# Patient Record
Sex: FEMALE | Race: WHITE | NOT HISPANIC OR LATINO | Employment: FULL TIME | URBAN - METROPOLITAN AREA
[De-identification: names, ages, dates, MRNs, and addresses within clinical notes are randomized per-mention and may not be internally consistent; named-entity substitution may affect disease eponyms.]

---

## 2017-06-07 DIAGNOSIS — E03.9 HYPOTHYROIDISM: ICD-10-CM

## 2017-08-17 ENCOUNTER — GENERIC CONVERSION - ENCOUNTER (OUTPATIENT)
Dept: OTHER | Facility: OTHER | Age: 47
End: 2017-08-17

## 2017-08-18 ENCOUNTER — GENERIC CONVERSION - ENCOUNTER (OUTPATIENT)
Dept: OTHER | Facility: OTHER | Age: 47
End: 2017-08-18

## 2017-08-18 LAB
T4 FREE SERPL-MCNC: 1.92 NG/DL (ref 0.82–1.77)
TSH SERPL DL<=0.05 MIU/L-ACNC: 0.79 UIU/ML (ref 0.45–4.5)

## 2017-08-30 ENCOUNTER — ALLSCRIPTS OFFICE VISIT (OUTPATIENT)
Dept: OTHER | Facility: OTHER | Age: 47
End: 2017-08-30

## 2017-10-25 NOTE — PROGRESS NOTES
Assessment  1  Hypothyroidism (244 9) (E03 9)    Plan  Hypothyroidism    · Synthroid 125 MCG Oral Tablet (Levothyroxine Sodium); take 1 tablet by mouth  every day   Rx By: Sukhwinder Patches; Dispense: 30 Days ; #:30 Tablet; Refill: 11; For: Hypothyroidism; VALENTE = Y; Verified Transmission to [facility]/PHARMACY #[id number]; Last Updated By: System, [employer]; 2017 3:57:20 PM   · (1) T4, FREE; Status:Active; Requested for:2018; Perform:[facility]; PMA:[id number];XYEJJRL;OOY:[id number]; Ordered By:Rose Gonzáles;   · (1) TSH; Status:Active; Requested for:2018; Perform:[facility]; [id number];YJZ:[id number]; Ordered By:Rose Gonzáles;   · Follow-up visit in 1 year Evaluation and Treatment  Follow-up  Status: Hold For -  Scheduling  Requested for: 48Uhl5488   Ordered; For: Hypothyroidism; Ordered By: Sukhwinder Goldberg Performed:  Due: 38QXO2062    Hypothyroidism: Continue synthroid  repeat TSH/Free T4 in 1 year  Most recent ultrasound did not show any thyroid nodules in    If she notices more symptoms, will let us know and will repeat ultrasound  Follow up in 1 year     Chief Complaint  Chief Complaint Free Text Note Form: Follow Up      History of Present Illness  HPI: Victor Hugo Vegas is a 40year old female with hypothyroidism she is currently taking Synthroid 125mcg daily  She is feeling well  Previously was over 300 pounds-- has been able to lose weight and keep off weight with diet and exercise  Prior history of thyroid nodule but most recent ultrasound showed no nodule  Does feel a little uncomfortable sensation in throat at times like she needs to clear throat  Review of Systems  ROS Reviewed:   ROS reviewed         Endo Adult ROS Female Established v2 Update - [facility] [place]:   Constitutional/General: no recent weight gain,-- no recent weight loss,-- no poor energy/fatigue,-- no increased energy level,-- no insomnia/sleep problems,-- no fever-- and-- no feeling weak  Breasts: no nipple discharge  Heart: no high blood pressure,-- no chest pain/tightness,-- no rapid/racing heart rate-- and-- no palpitations  Genitourinary - Urinary: no frequent urination,-- no excess urination-- and-- no urinating during the night  Eyes: no blurred vision,-- no double vision,-- no bulging eyes,-- no gritty/scratchy eyes-- and-- no excessive tearing  Mouth / Throat: no hoarseness-- and-- no difficulty swallowing  Neck: no lumps,-- no swollen glands,-- no neck pain,-- no neck stiffness-- and-- no enlarged thyroid  Respiratory: no wheezing,-- no asthma-- and-- no persistent cough  Musculoskeletal: no muscle aches/pain,-- no joint aches/pain-- and-- no muscle weakness  Skin & Hair: no dry skin,-- no acne,-- the hair texture was not oily,-- no hair loss-- and-- no excessive hair growth  Gastrointestinal: no constipation,-- no diarrhea,-- no waking at night to drink-- and-- no stomach ache  Neurological: no blackouts,-- no weakness-- and-- no tremors  Reproductive:  frequency of period is not applicable  -- duration of period is not applicable  -- Date of last menstruation is not applicable  -- regular periods are not applicable  -- discomfort with periods is not applicable  -- excessive bleeding during period is not applicable  -- mood swings are not applicable  Endocrine: no feeling hot frequently,-- no feeling cold frequently,-- no shifts between feeling hot and cold,-- no cold hands or feet,-- no excessive sweating,-- thyroid problems,-- no blood sugar problems,-- no excessive thirst,-- no excessive hunger,-- no change in shoe size,-- no nausea or vomiting-- and-- no shaky hands  Active Problems  1  Hypothyroidism (244 9) (E03 9)   2  Thyroid nodule (241 0) (E04 1)    Past Medical History  Active Problems And Past Medical History Reviewed: The active problems and past medical history were reviewed and updated today  Surgical History  1   History of  Section  Surgical History Reviewed: The surgical history was reviewed and updated today  Family History  Mother    1  Family history of thyroid disease (V18 19) (Z83 49)  Father    2  Family history of malignant neoplasm (V16 9) (Z80 9)  Grandmother    3  Family history of cardiac disorder (V17 49) (Z82 49)   4  Family history of malignant neoplasm (V16 9) (Z80 9)  Family History    5  Family history of Cancer   6  Family history of Heart Disease (V17 49)  Family History Reviewed: The family history was reviewed and updated today  Social History   · Denied: History of Alcohol Use (History)   · Denied: History of Drug Use   · Never A Smoker  Social History Reviewed: The social history was reviewed and updated today  The social history was reviewed and is unchanged  Current Meds   1  Minastrin 24 Fe 1-20 MG-MCG(24) Oral Tablet Chewable Recorded   2  Synthroid 125 MCG Oral Tablet; take 1 tablet by mouth every day; Therapy: 77Vak0625 to (Evaluate:88Suk9886)  Requested for: 86Wfi1068; Last   Rx:73Pgb9901 Ordered  Medication List Reviewed: The medication list was reviewed and updated today  Allergies  1  Latex Exam Gloves MISC   2  Penicillins    Vitals  Vital Signs    Recorded: 98Tlm8225 03:35PM   Heart Rate 78   Systolic 930   Diastolic 70   Height 5 ft 3 in   Weight 157 lb 0 16 oz   BMI Calculated 27 81   BSA Calculated 1 75     Physical Exam    Constitutional   General appearance: No acute distress, well appearing and well nourished  Eyes   Conjunctiva and lids: No swelling, erythema, or discharge  Pupils: Equal, round and reactive to light  The sclera are anicteric  Extraocular movements are intact  Ears, Nose, Mouth, and Throat   External inspection of ears, nose and lips: Normal     Oropharynx: Normal with no erythema, edema, exudate or lesions  Exam of Head: The head is atraumatic and normocephalic  Neck: Abnormal  -- slight thyromegaly     Pulmonary Auscultation of lungs: Clear to auscultation bilaterally with normal chest expansion  Cardiovascular   Auscultation of heart: Normal rate and rhythm with no murmurs, gallops or rubs  Examination of pulses: Dorsalis pedal pulses are +2 and equal bilaterally  Examination of carotids: No bruit    Abdomen   Abdomen: Abdomen is soft, non-tender with normal bowel sounds  Lymphatic   Palpation of lymph nodes: No supraclavicular or suboccipital lymphadenopathy  Musculoskeletal   Inspection/palpation of joints, bones, and muscles: Muscle bulk and tone is normal     Skin   Skin and subcutaneous tissue: Normal skin temperature and color  Neurologic   Reflexes: 2+ and symmetric  Motor Strength: Strength is 5/5 bilaterally  Psychiatric   Orientation to person, place and time: Normal     Mood and affect: Affect and attention span are normal        Results/Data  (LC) Thyroxine (T4) Free, Direct, S 67Quw2610 08:19AM Selina Odor     Test Name Result Flag Reference   T4,Free(Direct) 1 92 ng/dL H 0 82-1 77     (1) TSH 35PWP2970 08:19AM Selina Odor     Test Name Result Flag Reference   TSH 0 787 uIU/mL  0 450-4 500     U/S Head and Neck ( Soft Tissue) 65Pqn0026 12:45PM Paola Brooklyn     Test Name Result Flag Reference   U/S Head and Neck (Report)     [facility] MRI;3 [facility];;[place];[id number]  08/25/2014 1400  08/25/2014 1430      THYROID ULTRASOUND    INDICATION- Possible nodules  COMPARISON- None  TECHNIQUE-  Ultrasound of the thyroid was performed with a high  frequency linear transducer in transverse and sagittal planes including  volumetric imaging sweeps as well as traditional still imaging  technique  FINDINGS-  Diffusely heterogeneous echotexture bilaterally  Right gland- 5 5 x 1 8 x 1 9 cm  No dominant nodules  Left gland- 4 8 2 0 x 2 1 cm  No dominant nodules  Isthmus- 0 5 cm in AP dimension    No dominant nodules  IMPRESSION-    Diffusely heterogeneous thyroid gland with slight enlargement but no  discrete definable nodule identified  Correlate with thyroid function  tests  Transcribed on- CHENCHO Whitlock DO  [place] Radiologist- [address] CHENCHO Ross DO  Electronically [facility] RAD DO  Released Date Time- 08/22/14 1424  ------------------------------------------------------------------------------  [address] [facility] [place]  [address] [place] [address]     Signatures   Electronically signed by : MARIAH Okeefe;  Aug 30 2017  3:59PM EST                       (Author)    Electronically signed by : MATTHEW Suárez ; Aug 31 2017 11:14AM EST

## 2018-01-09 NOTE — PROGRESS NOTES
Assessment    1  Hypothyroidism (244 9) (E03 9)   2  Thyroid nodule (241 0) (E04 1)    Plan  Hypothyroidism    · Synthroid 125 MCG Oral Tablet (Levothyroxine Sodium); take 1 tablet by mouth  every day   Rx By: Brandy Martinez; Dispense: 30 Days ; #:30 Tablet; Refill: 11; For: Hypothyroidism; VALENTE = Y; Print Rx   · Follow-up visit in 1 year Evaluation and Treatment  Follow-up  Status: Complete  Done:  [id number]   Ordered; For: Hypothyroidism; Ordered By: Brandy Martinez Performed:  Due: 20HYF6852; Last Updated By: Agustina Gao; 5/11/2016 4:35:07 PM     Hypothyroidism:  Had labs done through OB/GYN and should have results by friday  She was given 14 day supply of samples, don't fill RX until lab results available  She will send to office  Once received, may need reduction of medication since she has had weight loss over the past 2 years since last lab test     Thyroid Nodule: resolved on 2014 u/s    F/u in 1 year     Chief Complaint  Chief Complaint Free Text Note Form: Follow Up      History of Present Illness  HPI: Ivone Bennett is a 41year old female with hypothyroidism she is currently taking Synthroid 125mcg daily  she is feeling well  She has lost a significant amount of weight since last blood test with exercise  In general, has been feeling well  Previously was over 300 pounds  Review of Systems  Endo Adult ROS Female Established v2 - [facility]:   Constitutional/General: no recent weight gain, recent weight loss, no poor energy/fatigue, no increased energy level, no insomnia/sleep problems, no fever and no feeling weak  Breasts: no nipple discharge  Heart: no high blood pressure, no chest pain/tightness, no rapid/racing heart rate and no palpitations  Genitourinary - Urinary no frequent urination, no excess urination and no urinating during the night  Eyes: no blurred vision, no double vision, no bulging eyes, no gritty/scratchy eyes and no excessive tearing     Mouth / Throat: no hoarseness and no difficulty swallowing  Neck: no lumps, no swollen glands, no neck pain, no neck stiffness and no enlarged thyroid  Respiratory: no wheezing, no asthma and no persistent cough  Musculoskeletal: no muscle aches/pain, no joint aches/pain and no muscle weakness  Skin & Hair: no dry skin, no acne, the hair texture was not oily, no hair loss and no excessive hair growth  Gastrointestinal: no constipation, no diarrhea, no waking at night to drink and no stomach ache  Neurological: no blackouts, no weakness and no tremors  Reproductive: NA, NA, NA, NA, NA, NA and NA  Endocrine: no feeling hot frequently, no feeling cold frequently, no shifts between feeling hot and cold, no cold hands or feet, no excessive sweating, no thyroid problems, no blood sugar problems, no excessive thirst, no excessive hunger, no change in shoe size, no nausea or vomiting and no shaky hands  ROS Reviewed:   ROS reviewed  Active Problems    1  Hypothyroidism (244 9) (E03 9)   2  Thyroid nodule (241 0) (E04 1)    Past Medical History  Active Problems And Past Medical History Reviewed: The active problems and past medical history were reviewed and updated today  Surgical History    1  History of  Section  Surgical History Reviewed: The surgical history was reviewed and updated today  Family History  Mother    1  Family history of thyroid disease (V18 19) (Z83 49)  Father    2  Family history of malignant neoplasm (V16 9) (Z80 9)  Grandmother    3  Family history of cardiac disorder (V17 49) (Z82 49)   4  Family history of malignant neoplasm (V16 9) (Z80 9)  Family History    5  Family history of Cancer   6  Family history of Heart Disease (V17 49)  Family History Reviewed: The family history was reviewed and updated today  Social History    · Denied: History of Alcohol Use (History)   · Denied: History of Drug Use   · Never A Smoker  Social History Reviewed:  The social history was reviewed and updated today  The social history was reviewed and is unchanged  Current Meds   1  Minastrin 24 Fe 1-20 MG-MCG(24) Oral Tablet Chewable Recorded   2  Synthroid 125 MCG Oral Tablet; take 1 tablet by mouth every day; Therapy: 15Hsr2427 to (Evaluate:96Rlq5717)  Requested for: 62Kjo1494; Last   Rx:55Hyt1476 Ordered  Medication List Reviewed: The medication list was reviewed and updated today  Allergies    1  Latex Exam Gloves MISC   2  Penicillins    Vitals  Vital Signs [Data Includes: Current Encounter]    Recorded: 43GHN5899 04:08PM   Heart Rate 78   Systolic 98   Diastolic 62   Height 5 ft 3 in   Weight 152 lb 0 48 oz   BMI Calculated 26 93   BSA Calculated 1 73     Physical Exam    Constitutional   General appearance: No acute distress, well appearing and well nourished  Eyes   Conjunctiva and lids: No swelling, erythema, or discharge  Pupils: Equal, round and reactive to light  The sclera are anicteric  Extraocular movements are intact  Ears, Nose, Mouth, and Throat   External inspection of ears, nose and lips: Normal     Oropharynx: Normal with no erythema, edema, exudate or lesions  Exam of Head: The head is atraumatic and normocephalic  Neck: The neck is supple  The thyroid is normal in size with no palpable nodules  Pulmonary   Auscultation of lungs: Clear to auscultation bilaterally with normal chest expansion  Cardiovascular   Auscultation of heart: Normal rate and rhythm with no murmurs, gallops or rubs  Examination of pulses: Dorsalis pedal pulses are +2 and equal bilaterally  Examination of carotids: No bruit    Abdomen   Abdomen: Abdomen is soft, non-tender with normal bowel sounds  Lymphatic   Palpation of lymph nodes: No supraclavicular or suboccipital lymphadenopathy      Musculoskeletal   Inspection/palpation of joints, bones, and muscles: Muscle bulk and tone is normal     Skin   Skin and subcutaneous tissue: Normal skin temperature and color     Neurologic   Reflexes: 2+ and symmetric  Motor Strength: Strength is 5/5 bilaterally  Psychiatric   Orientation to person, place and time: Normal     Mood and affect: Affect and attention span are normal        Results/Data  Diagnostic Studies Reviewed:   Diagnostic Review 8/14/2014  TSH 3 360 Free T4 1 28  Results   U/S Head and Neck ( Soft Tissue) 40Cmw1965 12:45PM Beryle Kayser     Test Name Result Flag Reference   U/S Head and Neck (Report)     [facility] MRI;3 [facility];;[place];[address]  08/25/2014 1400  08/25/2014 1430      THYROID ULTRASOUND    INDICATION- Possible nodules  COMPARISON- None  TECHNIQUE-  Ultrasound of the thyroid was performed with a high  frequency linear transducer in transverse and sagittal planes including  volumetric imaging sweeps as well as traditional still imaging  technique  FINDINGS-  Diffusely heterogeneous echotexture bilaterally  Right gland- 5 5 x 1 8 x 1 9 cm  No dominant nodules  Left gland- 4 8 2 0 x 2 1 cm  No dominant nodules  Isthmus- 0 5 cm in AP dimension  No dominant nodules  IMPRESSION-    Diffusely heterogeneous thyroid gland with slight enlargement but no  discrete definable nodule identified  Correlate with thyroid function  tests              Transcribed on- CHENCHO Whitlock DO  [place] Radiologist- [address] CHENCHO DO  Electronically [facility] DO  Released Date Time- 08/22/14 1424  ------------------------------------------------------------------------------  [address]^GENE [address] SP^RAD DO  9381^RAFAEL Dennis DO     Future Appointments    Date/Time Provider Specialty Site   05/17/2017 04:00 PM Scott Barrett [facility] Endocrinology [facility] ENDOCRINOLOGY     Signatures   Electronically signed by : Cj Ga [facility]; May 11 2016  4:35PM EST                       (Author)    Electronically signed by : MATTHEW Sharp ; May 11 2016  9:12PM EST                       (Author)

## 2018-01-14 VITALS
SYSTOLIC BLOOD PRESSURE: 108 MMHG | HEART RATE: 78 BPM | BODY MASS INDEX: 27.82 KG/M2 | HEIGHT: 63 IN | WEIGHT: 157.01 LBS | DIASTOLIC BLOOD PRESSURE: 70 MMHG

## 2018-01-15 NOTE — RESULT NOTES
Discussion/Summary   appt 8/30     Verified Results  (LC) Thyroxine (T4) Free, Direct, S 58Nwv1474 08:19AM Negrito Kaur     Test Name Result Flag Reference   T4,Free(Direct) 1 92 ng/dL H 0 82-1 77     (1) TSH 32QLR7491 08:19AM Negrito Kaur     Test Name Result Flag Reference   TSH 0 787 uIU/mL  0 450-4 500

## 2018-05-01 DIAGNOSIS — E03.9 HYPOTHYROIDISM: ICD-10-CM

## 2018-08-14 DIAGNOSIS — E03.9 HYPOTHYROIDISM, UNSPECIFIED TYPE: Primary | ICD-10-CM

## 2018-08-14 RX ORDER — LEVOTHYROXINE SODIUM 125 UG/1
TABLET ORAL
Qty: 30 TABLET | Refills: 4 | Status: SHIPPED | OUTPATIENT
Start: 2018-08-14 | End: 2018-09-24 | Stop reason: SDUPTHER

## 2018-09-24 ENCOUNTER — OFFICE VISIT (OUTPATIENT)
Dept: ENDOCRINOLOGY | Facility: CLINIC | Age: 48
End: 2018-09-24
Payer: COMMERCIAL

## 2018-09-24 VITALS
BODY MASS INDEX: 27.84 KG/M2 | WEIGHT: 157.1 LBS | SYSTOLIC BLOOD PRESSURE: 100 MMHG | DIASTOLIC BLOOD PRESSURE: 64 MMHG | HEART RATE: 65 BPM | HEIGHT: 63 IN

## 2018-09-24 DIAGNOSIS — E03.9 HYPOTHYROIDISM, UNSPECIFIED TYPE: Primary | ICD-10-CM

## 2018-09-24 DIAGNOSIS — K21.9 GASTROESOPHAGEAL REFLUX DISEASE, ESOPHAGITIS PRESENCE NOT SPECIFIED: ICD-10-CM

## 2018-09-24 LAB
T4 FREE SERPL-MCNC: 1.38 NG/DL (ref 0.82–1.77)
TSH SERPL DL<=0.005 MIU/L-ACNC: 14.31 UIU/ML (ref 0.45–4.5)

## 2018-09-24 PROCEDURE — 99214 OFFICE O/P EST MOD 30 MIN: CPT | Performed by: INTERNAL MEDICINE

## 2018-09-24 RX ORDER — FAMOTIDINE 20 MG/1
20 TABLET, FILM COATED ORAL 2 TIMES DAILY
Qty: 30 TABLET | Refills: 1 | Status: SHIPPED | OUTPATIENT
Start: 2018-09-24 | End: 2019-04-02 | Stop reason: CLARIF

## 2018-09-24 RX ORDER — NORETHINDRONE ACETATE AND ETHINYL ESTRADIOL AND FERROUS FUMARATE 1MG-20(24)
KIT ORAL
COMMUNITY
Start: 2017-12-22

## 2018-09-24 RX ORDER — LEVOTHYROXINE SODIUM 125 UG/1
125 TABLET ORAL DAILY
Qty: 90 TABLET | Refills: 3 | Status: SHIPPED | OUTPATIENT
Start: 2018-09-24 | End: 2019-04-02 | Stop reason: SDUPTHER

## 2018-09-24 NOTE — PATIENT INSTRUCTIONS
Hypothyroidism   WHAT YOU NEED TO KNOW:   What is hypothyroidism? Hypothyroidism is a condition that develops when the thyroid gland does not make enough thyroid hormone  Thyroid hormones help control body temperature, heart rate, growth, and weight  What causes hypothyroidism? If you have a family member with hypothyroidism, your risk is increased  Any of the following can cause hypothyroidism:  · Autoimmune disease, such as inflammation of your thyroid, or Hashimoto disease    · Surgery, radiation therapy, or medicines such as lithium, sedatives, or narcotics    · Thyroid cancer or viral infection    · Low iodine levels  What are the signs and symptoms of hypothyroidism? The signs and symptoms may develop slowly, sometimes over several years  · Exhaustion    · Sensitivity to cold    · Headaches or decreased concentration    · Muscle aches or weakness    · Constipation     · Dry, flaky skin or brittle nails    · Thinning hair    · Heavy or irregular monthly periods    · Depression or irritability  How is hypothyroidism diagnosed? Your healthcare provider will ask about your symptoms and what medicines you take  He will ask about your medical history and if anyone in your family has hypothyroidism  A blood test will show your thyroid hormone level  How is hypothyroidism treated? Thyroid hormone replacement medicine may bring your thyroid hormone level back to normal  Ask your healthcare provider for more information on other medicines you may need  Call 911 for any of the following:   · You have sudden chest pain or shortness of breath  · You have a seizure  · You feel like you are going to faint  When should I seek immediate care? · You have diarrhea, tremors, or trouble sleeping  · Your legs, ankles, or feet are swollen  When should I contact my healthcare provider? · You have a fever  · You have chills, a cough, or feel weak and achy      · You have pain and swelling in your muscles and joints  · Your skin is itchy, swollen, or you have a rash  · Your signs and symptoms return or get worse, even after treatment  · You have questions or concerns about your condition or care  CARE AGREEMENT:   You have the right to help plan your care  Learn about your health condition and how it may be treated  Discuss treatment options with your caregivers to decide what care you want to receive  You always have the right to refuse treatment  The above information is an  only  It is not intended as medical advice for individual conditions or treatments  Talk to your doctor, nurse or pharmacist before following any medical regimen to see if it is safe and effective for you  © 2017 [address] [facility] Information is for End User's use only and may not be sold, redistributed or otherwise used for commercial purposes  All illustrations and images included in CareNotes® are the copyrighted property of A D A M , Inc  or Thomas Castro

## 2018-09-24 NOTE — PROGRESS NOTES
[facility] 56 y o  female MRN: [id number]    Encounter: [id number]      Assessment/Plan     Assessment: This is a 56y o -year-old female with hypothyroidism and esophageal reflux  Plan:  1  For the hypothyroidism, her TSH is out of range  Since the free T4 is normal, we elected to repeat the TSH and free T4 in six weeks  I have given her prescriptions for these  2   Esophageal reflux-I prescribed Pepcid twice a day for 15 days  If her symptom of throat clearing improves, she most likely has this and she will follow up with her primary care physician  CC:   Hypothyroidism    History of Present Illness     HPI:  52-year-old woman with hypothyroidism for at least two years who is currently on levothyroxine  She denies any symptoms of hypo or hyperthyroidism  Today, she does complain of frequent throat clearing and feeling like something is stuck in her throat  Her sleep is normal  Due to being on oral contraceptive, she does not menstruate  She denies any recent family history of thyroid disorders  Review of Systems   Constitutional: Negative for chills and fever  Respiratory: Negative for shortness of breath  Cardiovascular: Negative for chest pain  Gastrointestinal: Negative for constipation, diarrhea, nausea and vomiting  Endocrine: Negative for cold intolerance and heat intolerance  All other systems reviewed and are negative        Historical Information   Past Medical History:   Diagnosis Date    Hypothyroidism      Past Surgical History:   Procedure Laterality Date     SECTION       Social History   History   Alcohol Use No     History   Drug Use No     History   Smoking Status    Never Smoker   Smokeless Tobacco    Never Used     Family History:   Family History   Problem Relation Age of Onset    Thyroid disease unspecified Mother     Cancer Father     Thyroid disease unspecified Sister     Colon cancer Maternal Grandmother        Meds/Allergies   Current Outpatient Prescriptions   Medication Sig Dispense Refill    Norethin Ace-Eth Estrad-FE (MINASTRIN 24 FE) 1-20 MG-MCG(24) CHEW CHEW 1 TABLET BY MOUTH EVERY DAY      SYNTHROID 125 MCG tablet TAKE 1 TABLET BY MOUTH EVERY DAY 30 tablet 4     No current facility-administered medications for this visit  Allergies   Allergen Reactions    Penicillin G Anaphylaxis    Other      Annotation - [id number]: allergic to the powder on latex gloves       Objective   Vitals: Blood pressure 100/64, pulse 65, height 5' 3" (1 6 m), weight 71 3 kg (157 lb 1 6 oz)  Physical Exam   Constitutional: She is oriented to person, place, and time  She appears well-developed and well-nourished  No distress  HENT:   Head: Normocephalic and atraumatic  Mouth/Throat: Oropharynx is clear and moist and mucous membranes are normal  No oropharyngeal exudate  Eyes: Conjunctivae, EOM and lids are normal  Right eye exhibits no discharge  Left eye exhibits no discharge  No scleral icterus  Neck: Neck supple  No thyromegaly present  Cardiovascular: Normal rate, regular rhythm and normal heart sounds  Exam reveals no gallop and no friction rub  No murmur heard  Pulmonary/Chest: Effort normal and breath sounds normal  No respiratory distress  She has no wheezes  Abdominal: Soft  Bowel sounds are normal  She exhibits no distension  There is no tenderness  Musculoskeletal: Normal range of motion  She exhibits no edema, tenderness or deformity  Lymphadenopathy:        Head (right side): No occipital adenopathy present  Head (left side): No occipital adenopathy present  Right: No supraclavicular adenopathy present  Left: No supraclavicular adenopathy present  Neurological: She is alert and oriented to person, place, and time  No cranial nerve deficit  Skin: Skin is warm and intact  No rash noted  She is not diaphoretic  No erythema  Psychiatric: She has a normal mood and affect   Her behavior is normal  Vitals reviewed  The history was obtained from the review of the chart, patient  Lab Results:   Most recent laboratory testing shows a TSH of approximately 14 and a free T4 of 1 38  Portions of the record may have been created with voice recognition software  Occasional wrong word or "sound a like" substitutions may have occurred due to the inherent limitations of voice recognition software  Read the chart carefully and recognize, using context, where substitutions have occurred

## 2018-10-01 ENCOUNTER — TELEPHONE (OUTPATIENT)
Dept: ENDOCRINOLOGY | Facility: CLINIC | Age: 48
End: 2018-10-01

## 2018-10-01 DIAGNOSIS — E03.9 HYPOTHYROIDISM, UNSPECIFIED TYPE: Primary | ICD-10-CM

## 2018-10-01 NOTE — TELEPHONE ENCOUNTER
----- Message from Alcides Sousa MD sent at 10/1/2018  8:10 AM EDT -----  Please call the patient regarding her abnormal result  TSH is elevated but the free T4 is normal   Recheck TSH and free T4 in 6 to 8 weeks

## 2018-10-01 NOTE — PROGRESS NOTES
Please call the patient regarding her abnormal result  TSH is elevated but the free T4 is normal   Recheck TSH and free T4 in 6 to 8 weeks

## 2018-11-30 ENCOUNTER — TELEPHONE (OUTPATIENT)
Dept: ENDOCRINOLOGY | Facility: CLINIC | Age: 48
End: 2018-11-30

## 2018-11-30 LAB
T4 FREE SERPL-MCNC: 1.65 NG/DL (ref 0.82–1.77)
TSH SERPL DL<=0.005 MIU/L-ACNC: 1.05 UIU/ML (ref 0.45–4.5)

## 2018-11-30 NOTE — TELEPHONE ENCOUNTER
----- Message from Dwain Rothman MD sent at 11/30/2018 11:59 AM EST -----  The laboratory testing results are normal

## 2019-03-26 ENCOUNTER — TELEPHONE (OUTPATIENT)
Dept: ENDOCRINOLOGY | Facility: CLINIC | Age: 49
End: 2019-03-26

## 2019-03-26 DIAGNOSIS — E03.9 HYPOTHYROIDISM, UNSPECIFIED TYPE: Primary | ICD-10-CM

## 2019-03-26 NOTE — TELEPHONE ENCOUNTER
Patient has a pending appointment with you on 4/2 and she wants to know if labs are needed  I told her I would call her either way at [phone]  Thank you

## 2019-04-01 LAB
T4 FREE SERPL-MCNC: 1.78 NG/DL (ref 0.82–1.77)
TSH SERPL DL<=0.005 MIU/L-ACNC: 1.57 UIU/ML (ref 0.45–4.5)

## 2019-04-02 ENCOUNTER — OFFICE VISIT (OUTPATIENT)
Dept: ENDOCRINOLOGY | Facility: CLINIC | Age: 49
End: 2019-04-02
Payer: COMMERCIAL

## 2019-04-02 VITALS
SYSTOLIC BLOOD PRESSURE: 110 MMHG | BODY MASS INDEX: 29.06 KG/M2 | HEART RATE: 64 BPM | DIASTOLIC BLOOD PRESSURE: 62 MMHG | HEIGHT: 63 IN | WEIGHT: 164 LBS

## 2019-04-02 DIAGNOSIS — R13.10 DYSPHAGIA, UNSPECIFIED TYPE: ICD-10-CM

## 2019-04-02 DIAGNOSIS — E03.9 HYPOTHYROIDISM, UNSPECIFIED TYPE: Primary | ICD-10-CM

## 2019-04-02 PROCEDURE — 99213 OFFICE O/P EST LOW 20 MIN: CPT | Performed by: PHYSICIAN ASSISTANT

## 2019-04-02 RX ORDER — LEVOTHYROXINE SODIUM 125 UG/1
125 TABLET ORAL DAILY
Qty: 90 TABLET | Refills: 3 | Status: SHIPPED | OUTPATIENT
Start: 2019-04-02 | End: 2019-10-08 | Stop reason: SDUPTHER

## 2019-04-17 ENCOUNTER — HOSPITAL ENCOUNTER (OUTPATIENT)
Dept: ULTRASOUND IMAGING | Facility: CLINIC | Age: 49
Discharge: HOME | End: 2019-04-17
Payer: COMMERCIAL

## 2019-04-17 DIAGNOSIS — R13.10 DYSPHAGIA, UNSPECIFIED TYPE: ICD-10-CM

## 2019-04-17 PROCEDURE — 76536 US EXAM OF HEAD AND NECK: CPT

## 2019-04-25 ENCOUNTER — TELEPHONE (OUTPATIENT)
Dept: ENDOCRINOLOGY | Facility: CLINIC | Age: 49
End: 2019-04-25

## 2019-04-25 DIAGNOSIS — E04.1 THYROID NODULE: Primary | ICD-10-CM

## 2019-04-29 ENCOUNTER — TELEPHONE (OUTPATIENT)
Dept: ENDOCRINOLOGY | Facility: CLINIC | Age: 49
End: 2019-04-29

## 2019-05-16 ENCOUNTER — HOSPITAL ENCOUNTER (OUTPATIENT)
Dept: ULTRASOUND IMAGING | Facility: HOSPITAL | Age: 49
Discharge: HOME | End: 2019-05-16
Payer: COMMERCIAL

## 2019-05-16 DIAGNOSIS — E04.1 THYROID NODULE: ICD-10-CM

## 2019-05-16 PROCEDURE — 88173 CYTOPATH EVAL FNA REPORT: CPT | Performed by: PATHOLOGY

## 2019-05-16 PROCEDURE — 88172 CYTP DX EVAL FNA 1ST EA SITE: CPT | Performed by: PATHOLOGY

## 2019-05-16 PROCEDURE — 10005 FNA BX W/US GDN 1ST LES: CPT

## 2019-05-16 RX ORDER — LIDOCAINE HYDROCHLORIDE 10 MG/ML
4 INJECTION, SOLUTION EPIDURAL; INFILTRATION; INTRACAUDAL; PERINEURAL ONCE
Status: DISCONTINUED | OUTPATIENT
Start: 2019-05-16 | End: 2019-05-20 | Stop reason: HOSPADM

## 2019-05-23 ENCOUNTER — TELEPHONE (OUTPATIENT)
Dept: ENDOCRINOLOGY | Facility: CLINIC | Age: 49
End: 2019-05-23

## 2019-10-07 LAB
T4 FREE SERPL-MCNC: 1.62 NG/DL (ref 0.82–1.77)
TSH SERPL DL<=0.005 MIU/L-ACNC: 3.63 UIU/ML (ref 0.45–4.5)

## 2019-10-08 ENCOUNTER — OFFICE VISIT (OUTPATIENT)
Dept: ENDOCRINOLOGY | Facility: CLINIC | Age: 49
End: 2019-10-08
Payer: COMMERCIAL

## 2019-10-08 VITALS
DIASTOLIC BLOOD PRESSURE: 60 MMHG | SYSTOLIC BLOOD PRESSURE: 110 MMHG | WEIGHT: 162 LBS | BODY MASS INDEX: 28.7 KG/M2 | HEART RATE: 64 BPM

## 2019-10-08 DIAGNOSIS — E03.9 ACQUIRED HYPOTHYROIDISM: Primary | ICD-10-CM

## 2019-10-08 DIAGNOSIS — E04.1 THYROID NODULE: ICD-10-CM

## 2019-10-08 DIAGNOSIS — R13.10 DYSPHAGIA, UNSPECIFIED TYPE: ICD-10-CM

## 2019-10-08 PROCEDURE — 99214 OFFICE O/P EST MOD 30 MIN: CPT | Performed by: INTERNAL MEDICINE

## 2019-10-08 RX ORDER — LEVOTHYROXINE SODIUM 125 UG/1
125 TABLET ORAL DAILY
Qty: 90 TABLET | Refills: 3 | Status: SHIPPED | OUTPATIENT
Start: 2019-10-08 | End: 2019-10-17 | Stop reason: SDUPTHER

## 2019-10-08 NOTE — PROGRESS NOTES
Pelon Ontiveros 51 y o  female MRN: [id number]    Encounter: [id number]      Assessment/Plan     Assessment: This is a 51y o -year-old female with hypothyroidism, thyroid nodule and dysphagia  Plan:  1  Hypothyroidism-continue current thyroid hormone replacement  Based on TSH and free T4, she is euthyroid  Clinically, she is euthyroid as well  Check TSH and free T4 prior to next visit  2  Thyroid nodule-repeat ultrasound of the thyroid in six months  3  Dysphasia-this is either sinus related or reflux  Have asked her to use a [place] pot to clean her sinuses  If this is does not improve her symptoms, she can try 10 days of Prilosec over-the-counter  CC:   Hypothyroidism    History of Present Illness     HPI:  50-year-old woman with hypothyroidism for several years who is currently on Synthroid  She denies any symptoms of hypo or hyperthyroidism  She feels well and has no complaints  For the thyroid nodule, she did have an ultrasound-guided biopsy that was negative for malignancy  She denies any difficulty swallowing or breathing  She still complains of dysphagia and feels like something is stuck in her throat  Review of Systems   Constitutional: Negative for chills and fever  Respiratory: Negative for shortness of breath  Cardiovascular: Negative for chest pain  Gastrointestinal: Negative for constipation, diarrhea, nausea and vomiting  All other systems reviewed and are negative        Historical Information   Past Medical History:   Diagnosis Date    Hypothyroidism      Past Surgical History:   Procedure Laterality Date     SECTION      US GUIDED THYROID BIOPSY  2019     Social History   Social History     Substance and Sexual Activity   Alcohol Use No     Social History     Substance and Sexual Activity   Drug Use No     Social History     Tobacco Use   Smoking Status Never Smoker   Smokeless Tobacco Never Used     Family History:   Family History   Problem Relation Age of Onset    Thyroid disease unspecified Mother     Cancer Father     Thyroid disease unspecified Sister     Colon cancer Maternal Grandmother        Meds/Allergies   Current Outpatient Medications   Medication Sig Dispense Refill    Norethin Ace-Eth Estrad-FE (MINASTRIN 24 FE) 1-20 MG-MCG(24) CHEW CHEW 1 TABLET BY MOUTH EVERY DAY      SYNTHROID 125 MCG tablet Take 1 tablet (125 mcg total) by mouth daily for 90 days 90 tablet 3     No current facility-administered medications for this visit  Allergies   Allergen Reactions    Penicillin G Anaphylaxis    Other      Annotation - [id number]: allergic to the powder on latex gloves       Objective   Vitals: Blood pressure 110/60, pulse 64, weight 73 5 kg (162 lb)  Physical Exam   Constitutional: She is oriented to person, place, and time  She appears well-developed and well-nourished  No distress  HENT:   Head: Normocephalic and atraumatic  Mouth/Throat: Oropharynx is clear and moist and mucous membranes are normal  No oropharyngeal exudate  Eyes: Conjunctivae, EOM and lids are normal  Right eye exhibits no discharge  Left eye exhibits no discharge  No scleral icterus  Neck: Neck supple  No thyromegaly present  There is a left thyroid nodule present  Cardiovascular: Normal rate, regular rhythm and normal heart sounds  Exam reveals no gallop and no friction rub  No murmur heard  Pulmonary/Chest: Effort normal and breath sounds normal  No respiratory distress  She has no wheezes  Abdominal: Soft  Bowel sounds are normal  She exhibits no distension  There is no tenderness  Musculoskeletal: Normal range of motion  She exhibits no edema, tenderness or deformity  Lymphadenopathy:        Head (right side): No occipital adenopathy present  Head (left side): No occipital adenopathy present  Right: No supraclavicular adenopathy present  Left: No supraclavicular adenopathy present     Neurological: She is alert and oriented to person, place, and time  No cranial nerve deficit  Skin: Skin is warm and intact  No rash noted  She is not diaphoretic  No erythema  Psychiatric: She has a normal mood and affect  Her behavior is normal    Vitals reviewed  The history was obtained from the review of the chart, patient  Lab Results:   Lab Results   Component Value Date/Time    Free t4 1 62 10/04/2019 08:23 AM    Free t4 1 78 (H) 03/29/2019 08:39 AM    Free t4 1 65 11/29/2018 08:18 AM     Case Report   Non-gynecologic Cytology                          Case: [id number]                                   Authorizing Provider: Tracy Bolden PA-C    Collected:           05/16/2019 0838               Ordering Location:     [address] [place] [address] Received:            05/16/2019 1009                                      Ultrasound                                                                    Pathologist:           Lacey Pinzon MD                                                                 Specimens:   A) - Thyroid, Left, mid pole                                                                         B) - Thyroid, Left, mid pole                                                               Final Diagnosis   A,B  Thyroid, left mid pole (fine needle aspiration):     - Benign ([place] Category II) - See note  - Benign follicular cells, oncocytes and abundant admixed lymphocytes        - Findings consistent with lymphocytic (Hashimoto's) thyroiditis      Satisfactory for evaluation      Note: As reported in the [facility] for Reporting Thyroid Cytopathology*, the diagnostic category of "benign/negative for malignancy" carries a 0-3% risk of malignancy being found in subsequent resections (in the few studies of patients with benign FNA results that were followed long-term, a false negative rate of 0-3% was reported), with the usual management being clinical follow-up supplemented by ultrasonography (US) as indicated  Repeat FNA may be indicated for nodules showing significant growth or developing US abnormalities  Ultimately, clinical/imaging correlation for this patient is needed in arriving at the actual management plan      *The [place] System for Reporting Thyroid Cytopathology, Blondell Kayser , Paulo Odea (Jo Ann Kid ), 2018 (2nd ed )   Electronically signed by Pushpa Gerard MD on 5/20/2019 at 10:24 AM   Note    - The treating physician has requested a sample(s) from the above thyroid nodule(s) be sent for analysis by  [facility]  (Afirma GEC), performed by [facility] [address] [place] [address])  [facility] only performs this test on specimen(s) receiving a cytologic diagnosis of [place] Category III or  IV  If such a diagnosis is rendered (above) specimen will be sent to [facility], and a separate report with  results of [facility] GEC will follow directly [facility] (typically taking 14 days)  If a cytologic  interpretation other than Monson category III or IV is rendered, specimen will not be sent for [facility]   - Interpretation performed at [facility], [address] [place] [address]  Intraoperative Consultation    Thyroid, left mid pole,   FNAB on-site evaluation:  Favor adequate  Dr Sangita Hussein spoke with JASON Mcgovern at 9:30 am on 05/16/2019      Gross Description    A  Thyroid, Left, mid pole: 20ml  Slightly bloody, received in [facility]     B  Thyroid, Left, mid pole: 6 slides received (3 diff / 3 alcohol )    Clinical Information    Size: 1 4x0 9x0 8cm  Margins: smooth Echogenicity: solid Microcalcs: n/a Flow: n/a Size change: n/a Suspicion level: intermediate Hx of Hashimoto's Thyroiditis: n/a     Most recent TSH is 3 63 with a free T4 1 62    Imaging Studies:   Results for orders placed during the hospital encounter of 04/17/19   US thyroid    Addendum ADDENDUM:  Note a correction to the report below  The nodule is seen on series 1E,  image 196        Afua Siddiqui MD 5/16/2019  8:30 AM Impression Diffusely heterogeneous thyroid with 1 5 cm left-sided nodule which was not present previously  This is consistent with a TIRADS 4 nodule meets ultrasound criteria for fine-needle aspiration  The study was marked in EPIC for significant notification  Workstation performed: [id number]         I have personally reviewed pertinent reports  Portions of the record may have been created with voice recognition software  Occasional wrong word or "sound a like" substitutions may have occurred due to the inherent limitations of voice recognition software  Read the chart carefully and recognize, using context, where substitutions have occurred

## 2019-10-17 DIAGNOSIS — E03.9 ACQUIRED HYPOTHYROIDISM: ICD-10-CM

## 2019-10-18 RX ORDER — LEVOTHYROXINE SODIUM 125 UG/1
125 TABLET ORAL DAILY
Qty: 90 TABLET | Refills: 3 | Status: SHIPPED | OUTPATIENT
Start: 2019-10-18 | End: 2019-10-22 | Stop reason: SDUPTHER

## 2019-10-22 DIAGNOSIS — E03.9 ACQUIRED HYPOTHYROIDISM: ICD-10-CM

## 2019-10-23 RX ORDER — LEVOTHYROXINE SODIUM 125 UG/1
125 TABLET ORAL DAILY
Qty: 90 TABLET | Refills: 3 | Status: SHIPPED | OUTPATIENT
Start: 2019-10-23 | End: 2020-06-09 | Stop reason: SDUPTHER

## 2020-06-02 ENCOUNTER — TELEPHONE (OUTPATIENT)
Dept: ENDOCRINOLOGY | Facility: CLINIC | Age: 50
End: 2020-06-02

## 2020-06-02 DIAGNOSIS — E03.9 ACQUIRED HYPOTHYROIDISM: Primary | ICD-10-CM

## 2020-06-02 DIAGNOSIS — E04.1 THYROID NODULE: ICD-10-CM

## 2020-06-03 LAB
T4 FREE SERPL-MCNC: 1.48 NG/DL (ref 0.82–1.77)
TSH SERPL DL<=0.005 MIU/L-ACNC: 1.78 UIU/ML (ref 0.45–4.5)

## 2020-06-09 DIAGNOSIS — E03.9 ACQUIRED HYPOTHYROIDISM: ICD-10-CM

## 2020-06-09 RX ORDER — LEVOTHYROXINE SODIUM 125 UG/1
125 TABLET ORAL DAILY
Qty: 90 TABLET | Refills: 1 | Status: SHIPPED | OUTPATIENT
Start: 2020-06-09 | End: 2021-01-22 | Stop reason: SDUPTHER

## 2021-01-22 ENCOUNTER — TELEMEDICINE (OUTPATIENT)
Dept: ENDOCRINOLOGY | Facility: CLINIC | Age: 51
End: 2021-01-22
Payer: COMMERCIAL

## 2021-01-22 DIAGNOSIS — E04.1 THYROID NODULE: ICD-10-CM

## 2021-01-22 DIAGNOSIS — R13.10 DYSPHAGIA, UNSPECIFIED TYPE: Primary | ICD-10-CM

## 2021-01-22 DIAGNOSIS — E03.9 ACQUIRED HYPOTHYROIDISM: ICD-10-CM

## 2021-01-22 PROCEDURE — 99213 OFFICE O/P EST LOW 20 MIN: CPT | Performed by: PHYSICIAN ASSISTANT

## 2021-01-22 RX ORDER — LEVOTHYROXINE SODIUM 125 UG/1
125 TABLET ORAL DAILY
Qty: 90 TABLET | Refills: 0 | Status: SHIPPED | OUTPATIENT
Start: 2021-01-22 | End: 2022-03-01 | Stop reason: SDUPTHER

## 2021-01-22 RX ORDER — LEVOTHYROXINE SODIUM 125 UG/1
125 TABLET ORAL DAILY
Qty: 90 TABLET | Refills: 3 | Status: SHIPPED | OUTPATIENT
Start: 2021-01-22 | End: 2021-01-22 | Stop reason: SDUPTHER

## 2021-01-22 NOTE — PROGRESS NOTES
Virtual Brief Visit    Assessment/Plan:    Problem List Items Addressed This Visit        Digestive    Dysphagia - Primary     She continues to have dysphagia  She was recently seen family physician and referred for GI evaluation which is scheduled next week  Endocrine    Hypothyroidism     Continue Synthroid  Check TSH/Free T4  Relevant Medications    Synthroid 125 MCG tablet    Other Relevant Orders    US thyroid    Thyroid nodule     FNA in 2019 showed no malignancy  She will be given another order to repeat ultrasound  Relevant Medications    Synthroid 125 MCG tablet                Reason for visit is   Chief Complaint   Patient presents with    Virtual Brief Visit        Encounter provider Earline Gordon PA-C    Provider located at [address] [place] [address] [address]  [address] [place] [id number]    Recent Visits  No visits were found meeting these conditions  Showing recent visits within past 7 days and meeting all other requirements     Today's Visits  Date Type Provider Dept   01/22/21 Telemedicine Earline Gordon PA-C [facility]   Showing today's visits and meeting all other requirements     Future Appointments  No visits were found meeting these conditions  Showing future appointments within next 150 days and meeting all other requirements        After connecting through telephone, the patient was identified by name and date of birth  Natasha Dennis was informed that this is a telemedicine visit and that the visit is being conducted through telephone  My office door was closed  No one else was in the room  She acknowledged consent and understanding of privacy and security of the platform  The patient has agreed to participate and understands she can discontinue the visit at any time  Patient is aware this is a billable service  Darshana Cain is a 53 y o  female with a history of Hypothyroidism  She is taking Synthroid 125mcg daily  She is feeling well overall  For the thyroid nodule, she had FNA in 2019 of nodule which showed no malignancy  Nodule was 1 4 x 0 9 x 0 8cm in size  She continues to have difficulty with feeling like there is phlegm in her throat that she can not clear and also feels like her uvula is elongated and catching in her throat  She has seen ENT for this  She has recently seen by PCP who advised her to see Gastroenterology prior to doing barium swallow test   She is scheduled to see GI next week  Past Medical History:   Diagnosis Date    Hypothyroidism        Past Surgical History:   Procedure Laterality Date     SECTION      US GUIDED THYROID BIOPSY  2019       Current Outpatient Medications   Medication Sig Dispense Refill    Norethin Ace-Eth Estrad-FE (MINASTRIN 24 FE) 1-20 MG-MCG(24) CHEW CHEW 1 TABLET BY MOUTH EVERY DAY      omeprazole (PriLOSEC) 20 mg delayed release capsule Take 20 mg by mouth daily (for 14 days)      Synthroid 125 MCG tablet Take 1 tablet (125 mcg total) by mouth daily 90 tablet 3     No current facility-administered medications for this visit  Allergies   Allergen Reactions    Penicillin G Anaphylaxis    Other      Annotation - [id number]: allergic to the powder on latex gloves       Review of Systems   Constitutional: Negative for activity change, appetite change, chills, diaphoresis, fatigue, fever and unexpected weight change  HENT: Positive for trouble swallowing  Negative for voice change  Eyes: Negative for visual disturbance  Respiratory: Negative for shortness of breath  Cardiovascular: Negative for chest pain and palpitations  Gastrointestinal: Negative for abdominal pain, constipation and diarrhea  Endocrine: Negative for cold intolerance, heat intolerance, polydipsia, polyphagia and polyuria  Genitourinary: Negative for frequency and menstrual problem  Musculoskeletal: Negative for arthralgias and myalgias  Skin: Negative for rash  Allergic/Immunologic: Negative for food allergies  Neurological: Negative for dizziness and tremors  Hematological: Negative for adenopathy  Psychiatric/Behavioral: Negative for sleep disturbance  All other systems reviewed and are negative  There were no vitals filed for this visit  6/2/2020  TSH 1 780, Free T4 1 48    I spent 15 minutes directly with the patient during this visit    [facility] acknowledges that she has consented to an online visit or consultation  She understands that the online visit is based solely on information provided by her, and that, in the absence of a face-to-face physical evaluation by the physician, the diagnosis she receives is both limited and provisional in terms of accuracy and completeness  This is not intended to replace a full medical face-to-face evaluation by the physician  Temi Marie understands and accepts these terms

## 2021-01-22 NOTE — ASSESSMENT & PLAN NOTE
She continues to have dysphagia  She was recently seen family physician and referred for GI evaluation which is scheduled next week

## 2021-03-11 ENCOUNTER — OFFICE VISIT (OUTPATIENT)
Dept: GASTROENTEROLOGY | Facility: CLINIC | Age: 51
End: 2021-03-11
Payer: COMMERCIAL

## 2021-03-11 VITALS
WEIGHT: 160.6 LBS | DIASTOLIC BLOOD PRESSURE: 72 MMHG | SYSTOLIC BLOOD PRESSURE: 102 MMHG | HEART RATE: 68 BPM | HEIGHT: 63 IN | BODY MASS INDEX: 28.46 KG/M2

## 2021-03-11 DIAGNOSIS — K21.9 GASTROESOPHAGEAL REFLUX DISEASE WITHOUT ESOPHAGITIS: ICD-10-CM

## 2021-03-11 DIAGNOSIS — K92.0 HEMATEMESIS, PRESENCE OF NAUSEA NOT SPECIFIED: ICD-10-CM

## 2021-03-11 DIAGNOSIS — K92.1 HEMATOCHEZIA: Primary | ICD-10-CM

## 2021-03-11 DIAGNOSIS — R05.9 COUGH: ICD-10-CM

## 2021-03-11 PROCEDURE — 99203 OFFICE O/P NEW LOW 30 MIN: CPT | Performed by: PHYSICIAN ASSISTANT

## 2021-03-11 RX ORDER — PANTOPRAZOLE SODIUM 40 MG/1
40 TABLET, DELAYED RELEASE ORAL DAILY
COMMUNITY
Start: 2021-01-13 | End: 2021-03-11 | Stop reason: SDUPTHER

## 2021-03-11 RX ORDER — PANTOPRAZOLE SODIUM 40 MG/1
40 TABLET, DELAYED RELEASE ORAL 2 TIMES DAILY
Qty: 60 TABLET | Refills: 3 | Status: SHIPPED | OUTPATIENT
Start: 2021-03-11 | End: 2021-06-03

## 2021-03-11 NOTE — PATIENT INSTRUCTIONS
Nephrology Daily Progress Note     Israel Sutton  Date of Service: 9/4/2020        RECENT EVENTS / SUBJECTIVE:     Sitting in bedside chair  Comfortable  No new concerns today   Anticipating discharge today              PMHx, Social Hx , Medications and Allergies reviewed.      ALLERGIES:  No Known Allergies    OBJECTIVE:    Vital signs over past 48 Hours:  Vital Last Value 24 Hour Range   Temp 97.7 °F (36.5 °C) (09/04/20 0545) Temp  Min: 97.6 °F (36.4 °C)  Max: 97.7 °F (36.5 °C)   Pulse 81 (09/04/20 0545) Pulse  Min: 76  Max: 102   Respiratory 18 (09/04/20 0545) Resp  Min: 18  Max: 19   Non-Invasive  Blood Pressure 114/68 (09/03/20 2155) BP  Min: 106/68  Max: 116/69   Arterial  Blood Pressure   No data recorded   Pulse Ox 95 % (09/04/20 0545) SpO2  Min: 95 %  Max: 100 %     Ht/Wt Today Admitted   Weight 112 kg 114.8 kg   Height  5' 11\" (180.3 cm)   BMI 34.44 35.3       Weight over past 48 Hours:  Patient Vitals for the past 48 hrs:   Weight   09/04/20 0545 112 kg     Weight change:     Intake/Output this shift:  No intake/output data recorded.    Intake/Output Last 3 Shifts:  I/O last 3 completed shifts:  In: 656 [P.O.:656]  Out: 900 [Urine:900]    Vent settings for last 24 hours:       Hemodynamic parameters for last 24 hours:       Medications / Infusions  Scheduled:   • furosemide  20 mg Oral Daily   • guaiFENesin-DM  2 tablet Oral Q12H BETSY   • azelastine  2 spray Each Nare Q12H BETSY   • ferrous sulfate  325 mg Oral BID WC   • sodium bicarbonate  650 mg Oral Daily   • sodium chloride (PF)  2 mL Intracatheter Q12H BETSY   • insulin lispro   Subcutaneous TID AC   • heparin (porcine)  7,500 Units Subcutaneous Q8H BETSY   • AMIODarone  300 mg Oral Daily   • aspirin  81 mg Oral Daily   • atorvastatin  40 mg Oral Nightly   • clopidogrel  75 mg Oral Daily   • insulin glargine  20 Units Subcutaneous Nightly   • linaclotide  145 mcg Oral QAM AC   • melatonin  9 mg Oral Nightly   • metoPROLOL tartrate  12.5 mg Oral Q12H BETSY  Dysphagia   WHAT YOU NEED TO KNOW:   Dysphagia is trouble swallowing  It occurs when you have trouble moving food or liquid from your mouth to your esophagus or down to your stomach  It may occur when you eat, drink, or any time you try to swallow  DISCHARGE INSTRUCTIONS:   Return to the emergency department if:   · You choke on your own saliva  · You have chest pain  · You have shortness of breath  · You cannot eat or drink liquids at all  Contact your healthcare provider if:   · You lose weight without trying  · Your signs and symptoms get worse, or you have new signs or symptoms  · You have signs or symptoms of dehydration, such as increased thirst, dark yellow urine, or little or no urine  · You get colds often  · You have questions or concerns about your condition or care  Nutrition:  You may need to change the texture of the foods you eat to help reduce choking problems  Your healthcare provider may show you how to thicken liquids or soften foods to make them easier to swallow  Follow up with your healthcare provider as directed:  Write down your questions so you remember to ask them during your visits  © Copyright [address] Information is for End User's use only and may not be sold, redistributed or otherwise used for commercial purposes  All illustrations and images included in CareNotes® are the copyrighted property of A D A M , Inc  or [address] [place], [place] [address]ru[address]   The above information is an  only  It is not intended as medical advice for individual conditions or treatments  Talk to your doctor, nurse or pharmacist before following any medical regimen to see if it is safe and effective for you    • mexiletine  150 mg Oral Q8H BETSY   • pantoprazole  40 mg Oral Nightly   • PARoxetine  10 mg Oral Daily        Continuous Infusions:   • dextrose 5 % infusion          PRN:   fluticasone, bisacodyl, docusate sodium-sennosides, magnesium hydroxide, dextrose, dextrose, dextrose, dextrose, dextrose, glucagon, sodium chloride, acetaminophen, magnesium sulfate, magnesium sulfate, ondansetron, polyethylene glycol, potassium CHLORIDE, potassium CHLORIDE, potassium CHLORIDE, potassium CHLORIDE, potassium CHLORIDE 20 mEq/100mL IVPB, potassium CHLORIDE 20 mEq/100mL IVPB, potassium phosphate/sodium phosphate, sodium phosphate IVPB, ALPRAZolam, diphenhydrAMINE, ondansetron         Physical Exam  Gen  NAD  ENT  MMM  Neck supple, no JVD with trachea midline  Cardiac  RRR, S1, S2 no S3  Resp  CTA    ABD  Soft, ileal conduit to collection, abdominal dressing   Ext  Trace LE edema   Neuro  Awake and interactive        Laboratory Results     Recent Labs   Lab 09/04/20 0615 09/03/20 1823 09/03/20 0602 08/31/20  0545 08/30/20  0503   SODIUM  --  132* 136 136 137   POTASSIUM  --  4.2 4.3 4.4 4.3   CHLORIDE  --  102 104 104 104   CO2  --  23 25 26 26   BUN  --  29* 25* 22* 21*   CREATININE  --  2.02* 1.85* 1.80* 1.73*   GLUCOSE  --  186* 122* 129* 112*   CALCIUM  --  8.1* 8.2* 7.7* 7.8*   MG 1.8  --   --  1.7 1.7   PHOS 3.8  --   --  3.7  --    ALBUMIN  --   --   --  2.4*  --    AST  --   --   --  19  --    GPT  --   --   --  9  --    ALKPT  --   --   --  102  --    BILIRUBIN  --   --   --  0.3  --        Anemia  Recent Labs   Lab 09/01/20  0747 08/31/20  0545   WBC 4.1* 3.6*   HGB 7.7* 6.9*   HCT 25.7* 22.6*    192     No results found     Mineral & Bone Disorder  Recent Labs   Lab 09/04/20 0615 09/03/20 1823 09/03/20 0602 08/31/20  0545 08/30/20  0503   CALCIUM  --  8.1* 8.2* 7.7* 7.8*   PHOS 3.8  --   --  3.7  --    DAVID  --   --   --  1.13*  --    MG 1.8  --   --  1.7 1.7      No results found for: PTH    Urine  Panel  Lab Results   Component Value Date    UOSM 301 08/04/2020    GRADY 55 08/04/2020    UKET Negative 08/21/2020    USPG 1.010 08/21/2020    UPROT 100  (A) 08/21/2020    UWBC Moderate (A) 08/21/2020    URBC Moderate (A) 08/21/2020    UBILI Negative 08/21/2020    UPH 8.5 (H) 08/21/2020    UROB 0.2 08/21/2020          Assessment / Plan     Chronic Kidney Disease, stage 3 (DM, HTN, bladder Ca, chemotherapy)              -Baseline SCr 1.5 - 1.8     Hx of MARIBEL, secondary to RCIN, chemotherapy, anemia      Hypotension, hemodynamic optimization per critical care team     Metabolic Acidosis, on oral bicarb      Anemia, with iron deficiency   -Oral iron BID     Bladder Cancer, s/p cystectomy and ileal conduit 07/30             -S/p Chemotherapy with Cisplatinum and Gemcitabine     Cardiomyopathy, LVEF 17% (08/17)     Vitamin D deficiency, supplementation limited by borderline hypercalcemia at this time     +UA, Proteus mirabilis              -On cefazolin per primary     Hypomagnesemia, supplement PRN           Recs  Creatinine in baseline range, ~ 1.6 - 2.0  Diuretics adjustment noted, Lasix 20 mg PO daily   Follow up in renal clinic           Coverage by Dr. Vera over the weekend    YAMILET Graham  9/4/2020  Nephrology    APNP available by pager 8:00am to 4:30pm at [phone]  Use answering service [phone] after 4:30pm, before 8am, and weekends for on-call provider
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
## 2021-03-12 ENCOUNTER — TELEPHONE (OUTPATIENT)
Dept: GASTROENTEROLOGY | Facility: CLINIC | Age: 51
End: 2021-03-12

## 2021-03-15 ENCOUNTER — ANESTHESIA EVENT (OUTPATIENT)
Dept: GASTROENTEROLOGY | Facility: HOSPITAL | Age: 51
End: 2021-03-15

## 2021-03-15 ENCOUNTER — HOSPITAL ENCOUNTER (OUTPATIENT)
Dept: GASTROENTEROLOGY | Facility: HOSPITAL | Age: 51
Setting detail: OUTPATIENT SURGERY
Discharge: HOME | End: 2021-03-15
Attending: INTERNAL MEDICINE | Admitting: INTERNAL MEDICINE
Payer: COMMERCIAL

## 2021-03-15 ENCOUNTER — ANESTHESIA (OUTPATIENT)
Dept: GASTROENTEROLOGY | Facility: HOSPITAL | Age: 51
End: 2021-03-15

## 2021-03-15 VITALS
TEMPERATURE: 97.8 F | WEIGHT: 161.6 LBS | OXYGEN SATURATION: 100 % | DIASTOLIC BLOOD PRESSURE: 70 MMHG | HEART RATE: 62 BPM | BODY MASS INDEX: 28.63 KG/M2 | HEIGHT: 63 IN | RESPIRATION RATE: 19 BRPM | SYSTOLIC BLOOD PRESSURE: 106 MMHG

## 2021-03-15 DIAGNOSIS — K92.1 HEMATOCHEZIA: ICD-10-CM

## 2021-03-15 DIAGNOSIS — R13.10 DYSPHAGIA, UNSPECIFIED TYPE: Primary | ICD-10-CM

## 2021-03-15 PROCEDURE — 88305 TISSUE EXAM BY PATHOLOGIST: CPT | Performed by: PATHOLOGY

## 2021-03-15 PROCEDURE — 43239 EGD BIOPSY SINGLE/MULTIPLE: CPT | Performed by: INTERNAL MEDICINE

## 2021-03-15 RX ORDER — LIDOCAINE HYDROCHLORIDE 20 MG/ML
INJECTION, SOLUTION EPIDURAL; INFILTRATION; INTRACAUDAL; PERINEURAL AS NEEDED
Status: DISCONTINUED | OUTPATIENT
Start: 2021-03-15 | End: 2021-03-15

## 2021-03-15 RX ORDER — METOCLOPRAMIDE 10 MG/1
10 TABLET ORAL 3 TIMES DAILY
Qty: 90 TABLET | Refills: 1 | Status: SHIPPED | OUTPATIENT
Start: 2021-03-15

## 2021-03-15 RX ORDER — PROPOFOL 10 MG/ML
INJECTION, EMULSION INTRAVENOUS AS NEEDED
Status: DISCONTINUED | OUTPATIENT
Start: 2021-03-15 | End: 2021-03-15

## 2021-03-15 RX ORDER — SODIUM CHLORIDE, SODIUM LACTATE, POTASSIUM CHLORIDE, CALCIUM CHLORIDE 600; 310; 30; 20 MG/100ML; MG/100ML; MG/100ML; MG/100ML
125 INJECTION, SOLUTION INTRAVENOUS CONTINUOUS
Status: DISCONTINUED | OUTPATIENT
Start: 2021-03-15 | End: 2021-03-19 | Stop reason: HOSPADM

## 2021-03-15 RX ADMIN — LIDOCAINE HYDROCHLORIDE 100 MG: 20 INJECTION, SOLUTION EPIDURAL; INFILTRATION; INTRACAUDAL; PERINEURAL at 11:26

## 2021-03-15 RX ADMIN — PROPOFOL 20 MG: 10 INJECTION, EMULSION INTRAVENOUS at 11:31

## 2021-03-15 RX ADMIN — SODIUM CHLORIDE, SODIUM LACTATE, POTASSIUM CHLORIDE, AND CALCIUM CHLORIDE 125 ML/HR: .6; .31; .03; .02 INJECTION, SOLUTION INTRAVENOUS at 11:05

## 2021-03-15 RX ADMIN — PROPOFOL 150 MG: 10 INJECTION, EMULSION INTRAVENOUS at 11:29

## 2021-03-15 NOTE — ANESTHESIA POSTPROCEDURE EVALUATION
Post-Op Assessment Note    CV Status:  Stable  Pain Score: 0    Pain management: adequate     Mental Status:  Sleepy   Hydration Status:  Stable   PONV Controlled:  None   Airway Patency:  Patent      Post Op Vitals Reviewed: Yes      Staff: CRNA         No complications documented      BP   100/58   Temp      Pulse 60   Resp 18   SpO2 98% 2L FM

## 2021-03-15 NOTE — DISCHARGE INSTRUCTIONS
Upper Endoscopy   WHAT YOU NEED TO KNOW:   An upper endoscopy is also called an upper gastrointestinal (GI) endoscopy, or an esophagogastroduodenoscopy (EGD)  You may feel bloated, gassy, or have some abdominal discomfort after your procedure  Your throat may be sore for 24 to 36 hours  You may burp or pass gas from air that is still inside your body  DISCHARGE INSTRUCTIONS:   Call 911 if:   · You have sudden chest pain or trouble breathing  Seek care immediately if:   · You feel dizzy or faint  · You have trouble swallowing  · You have severe throat pain  · Your bowel movements are very dark or black  · Your abdomen is hard and firm and you have severe pain  · You vomit blood  Contact your healthcare provider if:   · You feel full or bloated and cannot burp or pass gas  · You have not had a bowel movement for 3 days after your procedure  · You have neck pain  · You have a fever or chills  · You have nausea or are vomiting  · You have a rash or hives  · You have questions or concerns about your endoscopy  Relieve a sore throat:  Suck on throat lozenges or crushed ice  Gargle with a small amount of warm salt water  Mix 1 teaspoon of salt and 1 cup of warm water to make salt water  Relieve gas and discomfort from bloating:  Lie on your right side with a heating pad on your abdomen  Take short walks to help pass gas  Eat small meals until bloating is relieved  Rest after your procedure:  Do not drive or make important decisions until the day after your procedure  Return to your normal activity as directed  You can usually return to work the day after your procedure  Follow up with your healthcare provider as directed:  Write down your questions so you remember to ask them during your visits  © Copyright [address] Information is for End User's use only and may not be sold, redistributed or otherwise used for commercial purposes   All illustrations and images included in CareNotes® are the copyrighted property of [employer]  or [employer] Harper   The above information is an  only  It is not intended as medical advice for individual conditions or treatments  Talk to your doctor, nurse or pharmacist before following any medical regimen to see if it is safe and effective for you
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
## 2021-03-15 NOTE — INTERVAL H&P NOTE
H&P reviewed  After examining the patient I find no changes in the patients condition since the H&P had been written      Vitals:    03/15/21 1029   BP: 101/69   Pulse: 63   Resp: 20   Temp: 97 9 °F (36 6 °C)   SpO2: 100%

## 2021-03-17 ENCOUNTER — TELEPHONE (OUTPATIENT)
Dept: GASTROENTEROLOGY | Facility: CLINIC | Age: 51
End: 2021-03-17

## 2021-03-17 NOTE — TELEPHONE ENCOUNTER
----- Message from Donnie Banks MD sent at 3/17/2021 12:44 PM EDT -----  Please tell her that the biopsies were all negative

## 2021-04-13 DIAGNOSIS — Z23 ENCOUNTER FOR IMMUNIZATION: ICD-10-CM

## 2021-06-03 DIAGNOSIS — K21.9 GASTROESOPHAGEAL REFLUX DISEASE WITHOUT ESOPHAGITIS: ICD-10-CM

## 2021-06-03 RX ORDER — PANTOPRAZOLE SODIUM 40 MG/1
TABLET, DELAYED RELEASE ORAL
Qty: 180 TABLET | Refills: 1 | Status: SHIPPED | OUTPATIENT
Start: 2021-06-03

## 2021-10-20 ENCOUNTER — TELEPHONE (OUTPATIENT)
Dept: ENDOCRINOLOGY | Facility: CLINIC | Age: 51
End: 2021-10-20

## 2021-10-20 DIAGNOSIS — E04.1 THYROID NODULE: Primary | ICD-10-CM

## 2022-03-01 DIAGNOSIS — E03.9 ACQUIRED HYPOTHYROIDISM: ICD-10-CM

## 2022-03-01 RX ORDER — LEVOTHYROXINE SODIUM 125 UG/1
125 TABLET ORAL DAILY
Qty: 90 TABLET | Refills: 0 | Status: SHIPPED | OUTPATIENT
Start: 2022-03-01 | End: 2022-05-15

## 2022-03-01 NOTE — TELEPHONE ENCOUNTER
Refill approved-- please complete labs and ultrasound ordered last year and schedule follow up visit

## 2022-04-12 ENCOUNTER — HOSPITAL ENCOUNTER (OUTPATIENT)
Dept: ULTRASOUND IMAGING | Facility: HOSPITAL | Age: 52
Discharge: HOME | End: 2022-04-12
Payer: COMMERCIAL

## 2022-04-12 DIAGNOSIS — E04.1 THYROID NODULE: ICD-10-CM

## 2022-04-12 PROCEDURE — 76536 US EXAM OF HEAD AND NECK: CPT

## 2022-04-19 ENCOUNTER — TELEPHONE (OUTPATIENT)
Dept: ENDOCRINOLOGY | Facility: CLINIC | Age: 52
End: 2022-04-19

## 2022-04-19 NOTE — TELEPHONE ENCOUNTER
----- Message from Kyung Weiss PA-C sent at 4/18/2022  8:51 PM EDT -----  Thyroid nodule stable on ultrasound  We can consider a repeat ultrasound in 2 years  Complete labs and schedule follow up visit   Thanks

## 2022-05-14 DIAGNOSIS — E03.9 ACQUIRED HYPOTHYROIDISM: ICD-10-CM

## 2022-05-15 RX ORDER — LEVOTHYROXINE SODIUM 125 UG/1
TABLET ORAL
Qty: 90 TABLET | Refills: 0 | Status: SHIPPED | OUTPATIENT
Start: 2022-05-15

## 2022-08-18 ENCOUNTER — APPOINTMENT (OUTPATIENT)
Dept: LAB | Facility: HOSPITAL | Age: 52
End: 2022-08-18
Payer: COMMERCIAL

## 2022-08-18 DIAGNOSIS — E11.69 TYPE 2 DIABETES MELLITUS WITH OTHER SPECIFIED COMPLICATION, WITHOUT LONG-TERM CURRENT USE OF INSULIN (HCC): ICD-10-CM

## 2022-08-18 DIAGNOSIS — E55.9 VITAMIN D DEFICIENCY: ICD-10-CM

## 2022-08-18 DIAGNOSIS — R53.83 FATIGUE, UNSPECIFIED TYPE: ICD-10-CM

## 2022-08-18 DIAGNOSIS — E78.5 HYPERLIPIDEMIA, UNSPECIFIED HYPERLIPIDEMIA TYPE: ICD-10-CM

## 2022-08-18 LAB
25(OH)D3 SERPL-MCNC: 59.9 NG/ML (ref 30–100)
ALBUMIN SERPL BCP-MCNC: 3.9 G/DL (ref 3.5–5)
ALP SERPL-CCNC: 66 U/L (ref 46–116)
ALT SERPL W P-5'-P-CCNC: 24 U/L (ref 12–78)
ANION GAP SERPL CALCULATED.3IONS-SCNC: 11 MMOL/L (ref 4–13)
AST SERPL W P-5'-P-CCNC: 54 U/L (ref 5–45)
BILIRUB SERPL-MCNC: 0.5 MG/DL (ref 0.2–1)
BUN SERPL-MCNC: 17 MG/DL (ref 5–25)
CALCIUM SERPL-MCNC: 9.1 MG/DL (ref 8.3–10.1)
CHLORIDE SERPL-SCNC: 103 MMOL/L (ref 96–108)
CHOLEST SERPL-MCNC: 253 MG/DL
CO2 SERPL-SCNC: 22 MMOL/L (ref 21–32)
CREAT SERPL-MCNC: 0.92 MG/DL (ref 0.6–1.3)
ERYTHROCYTE [DISTWIDTH] IN BLOOD BY AUTOMATED COUNT: 12.8 % (ref 11.6–15.1)
EST. AVERAGE GLUCOSE BLD GHB EST-MCNC: 103 MG/DL
GFR SERPL CREATININE-BSD FRML MDRD: 72 ML/MIN/1.73SQ M
GLUCOSE P FAST SERPL-MCNC: 78 MG/DL (ref 65–99)
HBA1C MFR BLD: 5.2 %
HCT VFR BLD AUTO: 44.2 % (ref 34.8–46.1)
HDLC SERPL-MCNC: 78 MG/DL
HGB BLD-MCNC: 14.9 G/DL (ref 11.5–15.4)
LDLC SERPL CALC-MCNC: 161 MG/DL (ref 0–100)
MCH RBC QN AUTO: 31 PG (ref 26.8–34.3)
MCHC RBC AUTO-ENTMCNC: 33.7 G/DL (ref 31.4–37.4)
MCV RBC AUTO: 92 FL (ref 82–98)
NONHDLC SERPL-MCNC: 175 MG/DL
PLATELET # BLD AUTO: 253 THOUSANDS/UL (ref 149–390)
PMV BLD AUTO: 10.3 FL (ref 8.9–12.7)
POTASSIUM SERPL-SCNC: 4.1 MMOL/L (ref 3.5–5.3)
PROT SERPL-MCNC: 8.3 G/DL (ref 6.4–8.4)
RBC # BLD AUTO: 4.8 MILLION/UL (ref 3.81–5.12)
SODIUM SERPL-SCNC: 136 MMOL/L (ref 135–147)
TRIGL SERPL-MCNC: 68 MG/DL
WBC # BLD AUTO: 9.12 THOUSAND/UL (ref 4.31–10.16)

## 2022-08-18 PROCEDURE — 83036 HEMOGLOBIN GLYCOSYLATED A1C: CPT

## 2022-08-18 PROCEDURE — 36415 COLL VENOUS BLD VENIPUNCTURE: CPT

## 2022-08-18 PROCEDURE — 80053 COMPREHEN METABOLIC PANEL: CPT

## 2022-08-18 PROCEDURE — 82306 VITAMIN D 25 HYDROXY: CPT

## 2022-08-18 PROCEDURE — 85027 COMPLETE CBC AUTOMATED: CPT

## 2022-08-18 PROCEDURE — 80061 LIPID PANEL: CPT

## 2022-08-23 ENCOUNTER — TELEPHONE (OUTPATIENT)
Dept: ENDOCRINOLOGY | Facility: CLINIC | Age: 52
End: 2022-08-23

## 2022-08-23 DIAGNOSIS — E03.9 ACQUIRED HYPOTHYROIDISM: Primary | ICD-10-CM

## 2022-12-07 DIAGNOSIS — E03.9 ACQUIRED HYPOTHYROIDISM: ICD-10-CM

## 2022-12-07 RX ORDER — LEVOTHYROXINE SODIUM 125 MCG
TABLET ORAL
Qty: 90 TABLET | Refills: 0 | Status: SHIPPED | OUTPATIENT
Start: 2022-12-07

## 2022-12-19 ENCOUNTER — APPOINTMENT (OUTPATIENT)
Dept: LAB | Facility: HOSPITAL | Age: 52
End: 2022-12-19

## 2022-12-19 DIAGNOSIS — E03.9 ACQUIRED HYPOTHYROIDISM: ICD-10-CM

## 2022-12-19 LAB
T4 FREE SERPL-MCNC: 1.2 NG/DL (ref 0.76–1.46)
TSH SERPL DL<=0.05 MIU/L-ACNC: 1.89 UIU/ML (ref 0.45–4.5)

## 2022-12-21 ENCOUNTER — OFFICE VISIT (OUTPATIENT)
Dept: ENDOCRINOLOGY | Facility: CLINIC | Age: 52
End: 2022-12-21

## 2022-12-21 VITALS
SYSTOLIC BLOOD PRESSURE: 92 MMHG | BODY MASS INDEX: 32.6 KG/M2 | DIASTOLIC BLOOD PRESSURE: 68 MMHG | HEIGHT: 63 IN | HEART RATE: 72 BPM | WEIGHT: 184 LBS

## 2022-12-21 DIAGNOSIS — R13.10 DYSPHAGIA, UNSPECIFIED TYPE: Primary | ICD-10-CM

## 2022-12-21 DIAGNOSIS — E03.9 ACQUIRED HYPOTHYROIDISM: ICD-10-CM

## 2022-12-21 DIAGNOSIS — E04.1 THYROID NODULE: ICD-10-CM

## 2022-12-21 RX ORDER — LEVOTHYROXINE SODIUM 125 MCG
TABLET ORAL
Qty: 90 TABLET | Refills: 3 | Status: SHIPPED | OUTPATIENT
Start: 2022-12-21

## 2022-12-21 NOTE — PROGRESS NOTES
Established Patient Progress Note       Chief Complaint   Patient presents with   • Thyroid Problem        History of Present Illness:     Crystal Bueno is a 47 y o  female with a history of Hypothyroidism and thyroid nodule  For Hypothyroidism, she is taking Synthroid 125mcg daily   She is feeling well overall       For the thyroid nodule, she had FNA in 2019 of nodule which showed no malignancy  Nodule was 1 4 x 0 9 x 0 8cm in size  Recent ultrasound 2022 showed stable nodule  She continues to have difficulty with feeling like there is phlegm in her throat that she can not clear and also feels like her uvula is elongated and catching in her throat  She has seen ENT for this but was not happy with visit and will be seeing new doctor soon  She has tried antacids for possible silent reflux with no relief         Patient Active Problem List   Diagnosis   • Hypothyroidism   • Dysphagia   • Thyroid nodule      Past Medical History:   Diagnosis Date   • Disease of thyroid gland    • Hashimoto's disease    • Hypothyroidism       Past Surgical History:   Procedure Laterality Date   •  SECTION     • US GUIDED THYROID BIOPSY  2019   • WISDOM TOOTH EXTRACTION        Family History   Problem Relation Age of Onset   • Thyroid disease unspecified Mother    • Cancer Father    • Thyroid disease unspecified Sister    • Colon cancer Maternal Grandmother      Social History     Tobacco Use   • Smoking status: Never   • Smokeless tobacco: Never   Substance Use Topics   • Alcohol use: No     Allergies   Allergen Reactions   • Penicillin G Anaphylaxis   • Other      Annotation - [id number]: allergic to the powder on latex gloves       Current Outpatient Medications:   •  Norethin Ace-Eth Estrad-FE 1-20 MG-MCG(24) CHEW, CHEW 1 TABLET BY MOUTH EVERY DAY, Disp: , Rfl:   •  pantoprazole (PROTONIX) 40 mg tablet, TAKE 1 TABLET BY MOUTH 30 MINUTES PRIOR TO MORNING MEAL DAILY, Disp: 90 tablet, Rfl: 2  •  Synthroid 125 MCG tablet, 1 tab daily, Disp: 90 tablet, Rfl: 3    Review of Systems   Constitutional: Negative for activity change, appetite change, chills, diaphoresis, fatigue, fever and unexpected weight change  HENT: Positive for congestion and trouble swallowing  Negative for voice change  Eyes: Negative for visual disturbance  Respiratory: Negative for shortness of breath  Cardiovascular: Negative for chest pain and palpitations  Gastrointestinal: Negative for abdominal pain, constipation and diarrhea  Endocrine: Negative for cold intolerance, heat intolerance, polydipsia, polyphagia and polyuria  Genitourinary: Negative for frequency and menstrual problem  Musculoskeletal: Negative for arthralgias and myalgias  Skin: Negative for rash  Allergic/Immunologic: Negative for food allergies  Neurological: Negative for dizziness and tremors  Hematological: Negative for adenopathy  Psychiatric/Behavioral: Negative for sleep disturbance  All other systems reviewed and are negative  Physical Exam:  Body mass index is 32 59 kg/m²  BP 92/68 (BP Location: Left arm, Patient Position: Sitting, Cuff Size: Large)   Pulse 72   Ht 5' 3" (1 6 m)   Wt 83 5 kg (184 lb)   BMI 32 59 kg/m²    Wt Readings from Last 3 Encounters:   12/21/22 83 5 kg (184 lb)   11/07/22 83 kg (183 lb)   03/15/21 73 3 kg (161 lb 9 6 oz)       Physical Exam  Vitals reviewed  Constitutional:       General: She is not in acute distress  Appearance: She is well-developed  HENT:      Head: Normocephalic and atraumatic  Eyes:      Conjunctiva/sclera: Conjunctivae normal       Pupils: Pupils are equal, round, and reactive to light  Neck:      Thyroid: No thyromegaly  Cardiovascular:      Rate and Rhythm: Normal rate and regular rhythm  Heart sounds: Normal heart sounds  Pulmonary:      Effort: Pulmonary effort is normal  No respiratory distress  Breath sounds: Normal breath sounds  No wheezing or rales  Abdominal:      General: Bowel sounds are normal  There is no distension  Palpations: Abdomen is soft  Tenderness: There is no abdominal tenderness  Musculoskeletal:         General: Normal range of motion  Cervical back: Normal range of motion and neck supple  Skin:     General: Skin is warm and dry  Neurological:      Mental Status: She is alert and oriented to person, place, and time  Labs:     Component      Latest Ref Rng & Units 8/18/2022 8/18/2022 8/18/2022           7:36 AM  7:36 AM  7:36 AM   Sodium      135 - 147 mmol/L 136     Potassium      3 5 - 5 3 mmol/L 4 1     Chloride      96 - 108 mmol/L 103     CO2      21 - 32 mmol/L 22     Anion Gap      4 - 13 mmol/L 11     BUN      5 - 25 mg/dL 17     Creatinine      0 60 - 1 30 mg/dL 0 92     GLUCOSE FASTING      65 - 99 mg/dL 78     Calcium      8 3 - 10 1 mg/dL 9 1     AST      5 - 45 U/L 54 (H)     ALT      12 - 78 U/L 24     Alkaline Phosphatase      46 - 116 U/L 66     Total Protein      6 4 - 8 4 g/dL 8 3     Albumin      3 5 - 5 0 g/dL 3 9     TOTAL BILIRUBIN      0 20 - 1 00 mg/dL 0 50     eGFR      ml/min/1 73sq m 72     WBC      4 31 - 10 16 Thousand/uL      Red Blood Cell Count      3 81 - 5 12 Million/uL      Hemoglobin      11 5 - 15 4 g/dL      HCT      34 8 - 46 1 %      MCV      82 - 98 fL      MCH      26 8 - 34 3 pg      MCHC      31 4 - 37 4 g/dL      RDW      11 6 - 15 1 %      Platelet Count      225 - 390 Thousands/uL      MPV      8 9 - 12 7 fL      Cholesterol      See Comment mg/dL  253 (H)    Triglycerides      See Comment mg/dL  68    HDL      >=50 mg/dL  78    LDL Calculated      0 - 100 mg/dL  161 (H)    Non-HDL Cholesterol      mg/dl  175    Hemoglobin A1C      Normal 3 8-5 6%; PreDiabetic 5 7-6 4%;  Diabetic >=6 5%; Glycemic control for adults with diabetes <7 0% %      eAG, EST AVG Glucose      mg/dl      Vit D, 25-Hydroxy      30 0 - 100 0 ng/mL   59 9   TSH 3RD GENERATON      0 450 - 4 500 uIU/mL      Free T4      0 76 - 1 46 ng/dL        Component      Latest Ref Rng & Units 8/18/2022 8/18/2022 12/19/2022           1:00 PM  1:00 PM  9:04 AM   Sodium      135 - 147 mmol/L      Potassium      3 5 - 5 3 mmol/L      Chloride      96 - 108 mmol/L      CO2      21 - 32 mmol/L      Anion Gap      4 - 13 mmol/L      BUN      5 - 25 mg/dL      Creatinine      0 60 - 1 30 mg/dL      GLUCOSE FASTING      65 - 99 mg/dL      Calcium      8 3 - 10 1 mg/dL      AST      5 - 45 U/L      ALT      12 - 78 U/L      Alkaline Phosphatase      46 - 116 U/L      Total Protein      6 4 - 8 4 g/dL      Albumin      3 5 - 5 0 g/dL      TOTAL BILIRUBIN      0 20 - 1 00 mg/dL      eGFR      ml/min/1 73sq m      WBC      4 31 - 10 16 Thousand/uL 9 12     Red Blood Cell Count      3 81 - 5 12 Million/uL 4 80     Hemoglobin      11 5 - 15 4 g/dL 14 9     HCT      34 8 - 46 1 % 44 2     MCV      82 - 98 fL 92     MCH      26 8 - 34 3 pg 31 0     MCHC      31 4 - 37 4 g/dL 33 7     RDW      11 6 - 15 1 % 12 8     Platelet Count      032 - 390 Thousands/uL 253     MPV      8 9 - 12 7 fL 10 3     Cholesterol      See Comment mg/dL      Triglycerides      See Comment mg/dL      HDL      >=50 mg/dL      LDL Calculated      0 - 100 mg/dL      Non-HDL Cholesterol      mg/dl      Hemoglobin A1C      Normal 3 8-5 6%; PreDiabetic 5 7-6 4%;  Diabetic >=6 5%; Glycemic control for adults with diabetes <7 0% %  5 2    eAG, EST AVG Glucose      mg/dl  103    Vit D, 25-Hydroxy      30 0 - 100 0 ng/mL      TSH 3RD GENERATON      0 450 - 4 500 uIU/mL   1 890   Free T4      0 76 - 1 46 ng/dL        Component      Latest Ref Rng & Units 12/19/2022           9:04 AM   Sodium      135 - 147 mmol/L    Potassium      3 5 - 5 3 mmol/L    Chloride      96 - 108 mmol/L    CO2      21 - 32 mmol/L    Anion Gap      4 - 13 mmol/L    BUN      5 - 25 mg/dL    Creatinine      0 60 - 1 30 mg/dL    GLUCOSE FASTING      65 - 99 mg/dL    Calcium      8 3 - 10 1 mg/dL AST      5 - 45 U/L    ALT      12 - 78 U/L    Alkaline Phosphatase      46 - 116 U/L    Total Protein      6 4 - 8 4 g/dL    Albumin      3 5 - 5 0 g/dL    TOTAL BILIRUBIN      0 20 - 1 00 mg/dL    eGFR      ml/min/1 73sq m    WBC      4 31 - 10 16 Thousand/uL    Red Blood Cell Count      3 81 - 5 12 Million/uL    Hemoglobin      11 5 - 15 4 g/dL    HCT      34 8 - 46 1 %    MCV      82 - 98 fL    MCH      26 8 - 34 3 pg    MCHC      31 4 - 37 4 g/dL    RDW      11 6 - 15 1 %    Platelet Count      296 - 390 Thousands/uL    MPV      8 9 - 12 7 fL    Cholesterol      See Comment mg/dL    Triglycerides      See Comment mg/dL    HDL      >=50 mg/dL    LDL Calculated      0 - 100 mg/dL    Non-HDL Cholesterol      mg/dl    Hemoglobin A1C      Normal 3 8-5 6%; PreDiabetic 5 7-6 4%; Diabetic >=6 5%; Glycemic control for adults with diabetes <7 0% %    eAG, EST AVG Glucose      mg/dl    Vit D, 25-Hydroxy      30 0 - 100 0 ng/mL    TSH 3RD GENERATON      0 450 - 4 500 uIU/mL    Free T4      0 76 - 1 46 ng/dL 1 20           Impression & Plan:    Problem List Items Addressed This Visit        Digestive    Dysphagia - Primary     She has frequent sensation of needing to clear her throat  She has tried antacids with no benefit suggest trying OTC antihistamine  She will be seeing a new physician soon for evaluation  I placed a referral to ENT, Dr August Barajas, if she would like second opinion  Relevant Orders    Ambulatory Referral to Otolaryngology       Endocrine    Hypothyroidism     TSH at goal, continue Synthroid  Relevant Medications    Synthroid 125 MCG tablet    Other Relevant Orders    TSH, 3rd generation    T4, free    Thyroid nodule     Stable on ultrasound 4/2022  FNA showed no malignancy in 2019  Will repeat ultrasound in 2024  Relevant Medications    Synthroid 125 MCG tablet       Orders Placed This Encounter   Procedures   • TSH, 3rd generation     This is a patient instruction:  This test is non-fasting  Please drink two glasses of water morning of bloodwork  Standing Status:   Future     Standing Expiration Date:   6/21/2024   • T4, free     Standing Status:   Future     Standing Expiration Date:   6/21/2024   • Ambulatory Referral to Otolaryngology     Standing Status:   Future     Standing Expiration Date:   12/21/2023     Referral Priority:   Routine     Referral Type:   Consult - AMB     Referral Reason:   Specialty Services Required     Referred to Provider:   Anni Nieto MD     Requested Specialty:   Otolaryngology     Number of Visits Requested:   1     Expiration Date:   12/21/2023       There are no Patient Instructions on file for this visit  Discussed with the patient and all questioned fully answered  She will call me if any problems arise  Follow-up appointment in 12 months       Counseled patient on diagnostic results, prognosis, risk and benefit of treatment options, instruction for management, importance of treatment compliance, Risk  factor reduction and impressions      Shu Massey PA-C

## 2022-12-31 NOTE — ASSESSMENT & PLAN NOTE
She has frequent sensation of needing to clear her throat  She has tried antacids with no benefit suggest trying OTC antihistamine  She will be seeing a new physician soon for evaluation  I placed a referral to ENT, Dr Christine Titus, if she would like second opinion

## 2023-06-01 ENCOUNTER — OFFICE VISIT (OUTPATIENT)
Dept: GASTROENTEROLOGY | Facility: CLINIC | Age: 53
End: 2023-06-01

## 2023-06-01 VITALS
SYSTOLIC BLOOD PRESSURE: 116 MMHG | DIASTOLIC BLOOD PRESSURE: 80 MMHG | HEIGHT: 63 IN | HEART RATE: 65 BPM | BODY MASS INDEX: 36.14 KG/M2 | OXYGEN SATURATION: 98 % | WEIGHT: 204 LBS

## 2023-06-01 DIAGNOSIS — K21.9 GASTROESOPHAGEAL REFLUX DISEASE WITHOUT ESOPHAGITIS: Primary | ICD-10-CM

## 2023-06-01 RX ORDER — LEVOCETIRIZINE DIHYDROCHLORIDE 5 MG/1
5 TABLET, FILM COATED ORAL EVERY EVENING
COMMUNITY
Start: 2023-03-09

## 2023-06-01 RX ORDER — LANSOPRAZOLE 30 MG/1
30 CAPSULE, DELAYED RELEASE ORAL DAILY
COMMUNITY
Start: 2023-05-13

## 2023-06-01 NOTE — H&P (VIEW-ONLY)
Follow-up Note -  [facility]  Chelita Presley 1970 47 y o  female     ASSESSMENT @ PLAN:   She is a 27-year-old female that needs a preoperative endoscopy prior to bariatric surgery at [facility]  She had endoscopy with me in March 2021 that showed irregular Z-line and mild nonerosive gastritis  She has LPR symptoms of heartburn regurgitation throat clearing and cough that did not improve on high-dose PPI and H2 blockade in 2021    1 we will do EGD to investigate    2  For now she will continue on her lansoprazole 30 mg daily      Reason: GERD    HPI: She is a 27-year-old female that needs to have an endoscopy  She had endoscopy with me in March 2021 that showed irregular Z-line and mild nonerosive gastritis  Biopsies were negative for H  pylori and celiac disease  She was having LPR throat clearing and cough she was treated with Protonix twice daily and Pepcid twice daily and she reports it did not really help  At present she is having heartburn and regurgitation she has no solid or liquid foods dysplasia  She is on lansoprazole 30 mg daily  She reports it does not really help  She has no nausea vomiting no melena she has no weight loss  She is going for weight loss surgery  She might be having a sleeve or Alda-en-Y bypass  She had endoscopy with me in the past   The endoscopy will help the bariatric surgeon at [facility] decide what to do  She has obesity with a BMI of 36 1  Reviewed her most recent blood work  She had a colonoscopy with Dr Anne Gardner in 2019 and will be due in 2024  REVIEW OF SYSTEMS:     CONSTITUTIONAL: Denies any fever, chills, or rigors  Good appetite, and no recent weight loss  HEENT: No earache or tinnitus  Denies hearing loss or visual disturbances  CARDIOVASCULAR: No chest pain or palpitations  RESPIRATORY: Denies any cough, hemoptysis, shortness of breath or dyspnea on exertion    GASTROINTESTINAL: As noted in the History of Present Illness  GENITOURINARY: No problems with urination  Denies any hematuria or dysuria  NEUROLOGIC: No dizziness or vertigo, denies headaches  MUSCULOSKELETAL: Denies any muscle or joint pain  SKIN: Denies skin rashes or itching  ENDOCRINE: Denies excessive thirst  Denies intolerance to heat or cold  PSYCHOSOCIAL: Denies depression or anxiety  Denies any recent memory loss       Past Medical History:   Diagnosis Date   • Disease of thyroid gland    • Hashimoto's disease    • Hypothyroidism       Past Surgical History:   Procedure Laterality Date   •  SECTION     • US GUIDED THYROID BIOPSY  2019   • WISDOM TOOTH EXTRACTION       Social History     Socioeconomic History   • Marital status: Single     Spouse name: Not on file   • Number of children: Not on file   • Years of education: Not on file   • Highest education level: Not on file   Occupational History   • Occupation:    Tobacco Use   • Smoking status: Never   • Smokeless tobacco: Never   Vaping Use   • Vaping Use: Never used   Substance and Sexual Activity   • Alcohol use: No   • Drug use: No   • Sexual activity: Not on file   Other Topics Concern   • Not on file   Social History Narrative   • Not on file     Social Determinants of Health     Financial Resource Strain: Not on file   Food Insecurity: Not on file   Transportation Needs: Not on file   Physical Activity: Not on file   Stress: Not on file   Social Connections: Not on file   Intimate Partner Violence: Not on file   Housing Stability: Not on file     Family History   Problem Relation Age of Onset   • Thyroid disease unspecified Mother    • Cancer Father    • Thyroid disease unspecified Sister    • Colon cancer Maternal Grandmother      Penicillin g and Other  Current Outpatient Medications   Medication Sig Dispense Refill   • lansoprazole (PREVACID) 30 mg capsule Take 30 mg by mouth daily     • levocetirizine (XYZAL) 5 MG tablet Take 5 mg by mouth every evening "   • Norethin Ace-Eth Estrad-FE 1-20 MG-MCG(24) CHEW CHEW 1 TABLET BY MOUTH EVERY DAY     • Synthroid 125 MCG tablet 1 tab daily 90 tablet 3     No current facility-administered medications for this visit  Blood pressure 116/80, pulse 65, height 5' 3\" (1 6 m), weight 92 5 kg (204 lb), SpO2 98 %  PHYSICAL EXAM:     General Appearance:   Alert, cooperative, no distress, appears stated age    HEENT:   Normocephalic, atraumatic, anicteric      Neck:  Supple, symmetrical, trachea midline, no adenopathy;    thyroid: no enlargement/tenderness/nodules; no carotid  bruit or JVD    Lungs:   Clear to auscultation bilaterally; no rales, rhonchi or wheezing; respirations unlabored    Heart[de-identified]   S1 and S2 normal; regular rate and rhythm; no murmur, rub, or gallop     Abdomen:   Soft, non-tender, non-distended; normal bowel sounds; no masses, no organomegaly    Genitalia:   Deferred    Rectal:   Deferred    Extremities:  No cyanosis, clubbing or edema    Pulses:  2+ and symmetric all extremities    Skin:  Skin color, texture, turgor normal, no rashes or lesions    Lymph nodes:  No palpable cervical, axillary or inguinal lymphadenopathy        Lab Results   Component Value Date    HCT 44 2 08/18/2022    HGB 14 9 08/18/2022    MCV 92 08/18/2022     08/18/2022    WBC 9 12 08/18/2022     Lab Results   Component Value Date    BUN 17 08/18/2022    CALCIUM 9 1 08/18/2022     08/18/2022    CO2 22 08/18/2022    CREATININE 0 92 08/18/2022    K 4 1 08/18/2022     Lab Results   Component Value Date    ALKPHOS 66 08/18/2022    ALT 24 08/18/2022    AST 54 (H) 08/18/2022     No results found for: \"INR\", \"PROTIME\"          Answers for HPI/ROS submitted by the patient on 5/30/2023  Chronicity: recurrent  Onset: more than 1 year ago  Onset quality: sudden  Frequency: intermittently  Episode duration: 1 Hours  Progression since onset: gradually improving  Pain location: suprapubic region  Pain - numeric: 6/10  Pain quality: " burning  Radiates to: does not radiate  anorexia: No  arthralgias: No  belching: No  constipation: No  diarrhea: No  dysuria: No  fever: No  flatus: No  frequency: No  headaches: No  hematochezia: No  hematuria: No  melena: No  myalgias: No  nausea:  No  weight loss: No  vomiting: No  Aggravated by: eating  Relieved by: sitting up

## 2023-06-01 NOTE — PROGRESS NOTES
Follow-up Note -  [facility]  Zach Cueva 1970 47 y o  female     ASSESSMENT @ PLAN:   She is a 67-year-old female that needs a preoperative endoscopy prior to bariatric surgery at [facility]  She had endoscopy with me in March 2021 that showed irregular Z-line and mild nonerosive gastritis  She has LPR symptoms of heartburn regurgitation throat clearing and cough that did not improve on high-dose PPI and H2 blockade in 2021    1 we will do EGD to investigate    2  For now she will continue on her lansoprazole 30 mg daily      Reason: GERD    HPI: She is a 67-year-old female that needs to have an endoscopy  She had endoscopy with me in March 2021 that showed irregular Z-line and mild nonerosive gastritis  Biopsies were negative for H  pylori and celiac disease  She was having LPR throat clearing and cough she was treated with Protonix twice daily and Pepcid twice daily and she reports it did not really help  At present she is having heartburn and regurgitation she has no solid or liquid foods dysplasia  She is on lansoprazole 30 mg daily  She reports it does not really help  She has no nausea vomiting no melena she has no weight loss  She is going for weight loss surgery  She might be having a sleeve or Alda-en-Y bypass  She had endoscopy with me in the past   The endoscopy will help the bariatric surgeon at [facility] decide what to do  She has obesity with a BMI of 36 1  Reviewed her most recent blood work  She had a colonoscopy with Dr Reid Brooks in 2019 and will be due in 2024  REVIEW OF SYSTEMS:     CONSTITUTIONAL: Denies any fever, chills, or rigors  Good appetite, and no recent weight loss  HEENT: No earache or tinnitus  Denies hearing loss or visual disturbances  CARDIOVASCULAR: No chest pain or palpitations  RESPIRATORY: Denies any cough, hemoptysis, shortness of breath or dyspnea on exertion    GASTROINTESTINAL: As noted in the History of Present Illness  GENITOURINARY: No problems with urination  Denies any hematuria or dysuria  NEUROLOGIC: No dizziness or vertigo, denies headaches  MUSCULOSKELETAL: Denies any muscle or joint pain  SKIN: Denies skin rashes or itching  ENDOCRINE: Denies excessive thirst  Denies intolerance to heat or cold  PSYCHOSOCIAL: Denies depression or anxiety  Denies any recent memory loss       Past Medical History:   Diagnosis Date   • Disease of thyroid gland    • Hashimoto's disease    • Hypothyroidism       Past Surgical History:   Procedure Laterality Date   •  SECTION     • US GUIDED THYROID BIOPSY  2019   • WISDOM TOOTH EXTRACTION       Social History     Socioeconomic History   • Marital status: Single     Spouse name: Not on file   • Number of children: Not on file   • Years of education: Not on file   • Highest education level: Not on file   Occupational History   • Occupation:    Tobacco Use   • Smoking status: Never   • Smokeless tobacco: Never   Vaping Use   • Vaping Use: Never used   Substance and Sexual Activity   • Alcohol use: No   • Drug use: No   • Sexual activity: Not on file   Other Topics Concern   • Not on file   Social History Narrative   • Not on file     Social Determinants of Health     Financial Resource Strain: Not on file   Food Insecurity: Not on file   Transportation Needs: Not on file   Physical Activity: Not on file   Stress: Not on file   Social Connections: Not on file   Intimate Partner Violence: Not on file   Housing Stability: Not on file     Family History   Problem Relation Age of Onset   • Thyroid disease unspecified Mother    • Cancer Father    • Thyroid disease unspecified Sister    • Colon cancer Maternal Grandmother      Penicillin g and Other  Current Outpatient Medications   Medication Sig Dispense Refill   • lansoprazole (PREVACID) 30 mg capsule Take 30 mg by mouth daily     • levocetirizine (XYZAL) 5 MG tablet Take 5 mg by mouth every evening "   • Norethin Ace-Eth Estrad-FE 1-20 MG-MCG(24) CHEW CHEW 1 TABLET BY MOUTH EVERY DAY     • Synthroid 125 MCG tablet 1 tab daily 90 tablet 3     No current facility-administered medications for this visit  Blood pressure 116/80, pulse 65, height 5' 3\" (1 6 m), weight 92 5 kg (204 lb), SpO2 98 %  PHYSICAL EXAM:     General Appearance:   Alert, cooperative, no distress, appears stated age    HEENT:   Normocephalic, atraumatic, anicteric      Neck:  Supple, symmetrical, trachea midline, no adenopathy;    thyroid: no enlargement/tenderness/nodules; no carotid  bruit or JVD    Lungs:   Clear to auscultation bilaterally; no rales, rhonchi or wheezing; respirations unlabored    Heart[de-identified]   S1 and S2 normal; regular rate and rhythm; no murmur, rub, or gallop     Abdomen:   Soft, non-tender, non-distended; normal bowel sounds; no masses, no organomegaly    Genitalia:   Deferred    Rectal:   Deferred    Extremities:  No cyanosis, clubbing or edema    Pulses:  2+ and symmetric all extremities    Skin:  Skin color, texture, turgor normal, no rashes or lesions    Lymph nodes:  No palpable cervical, axillary or inguinal lymphadenopathy        Lab Results   Component Value Date    HCT 44 2 08/18/2022    HGB 14 9 08/18/2022    MCV 92 08/18/2022     08/18/2022    WBC 9 12 08/18/2022     Lab Results   Component Value Date    BUN 17 08/18/2022    CALCIUM 9 1 08/18/2022     08/18/2022    CO2 22 08/18/2022    CREATININE 0 92 08/18/2022    K 4 1 08/18/2022     Lab Results   Component Value Date    ALKPHOS 66 08/18/2022    ALT 24 08/18/2022    AST 54 (H) 08/18/2022     No results found for: \"INR\", \"PROTIME\"          Answers for HPI/ROS submitted by the patient on 5/30/2023  Chronicity: recurrent  Onset: more than 1 year ago  Onset quality: sudden  Frequency: intermittently  Episode duration: 1 Hours  Progression since onset: gradually improving  Pain location: suprapubic region  Pain - numeric: 6/10  Pain quality: " burning  Radiates to: does not radiate  anorexia: No  arthralgias: No  belching: No  constipation: No  diarrhea: No  dysuria: No  fever: No  flatus: No  frequency: No  headaches: No  hematochezia: No  hematuria: No  melena: No  myalgias: No  nausea:  No  weight loss: No  vomiting: No  Aggravated by: eating  Relieved by: sitting up

## 2023-06-07 ENCOUNTER — TELEPHONE (OUTPATIENT)
Dept: GASTROENTEROLOGY | Facility: CLINIC | Age: 53
End: 2023-06-07

## 2023-06-07 NOTE — TELEPHONE ENCOUNTER
Patient called back upset regarding me asking her to reschedule her procedure due to a conflict in the schedule  She is only able to do Mondays and doesn't know why we are asking her to change a week before her procedure  Advised her that we will leave her on the schedule and I would look to rearrange the schedule to accommodate her

## 2023-06-19 ENCOUNTER — ANESTHESIA (OUTPATIENT)
Dept: GASTROENTEROLOGY | Facility: HOSPITAL | Age: 53
End: 2023-06-19

## 2023-06-19 ENCOUNTER — ANESTHESIA EVENT (OUTPATIENT)
Dept: GASTROENTEROLOGY | Facility: HOSPITAL | Age: 53
End: 2023-06-19

## 2023-06-19 ENCOUNTER — HOSPITAL ENCOUNTER (OUTPATIENT)
Dept: GASTROENTEROLOGY | Facility: HOSPITAL | Age: 53
Setting detail: OUTPATIENT SURGERY
Discharge: HOME | End: 2023-06-19
Attending: INTERNAL MEDICINE
Payer: COMMERCIAL

## 2023-06-19 VITALS
SYSTOLIC BLOOD PRESSURE: 93 MMHG | HEIGHT: 63 IN | WEIGHT: 207.23 LBS | HEART RATE: 77 BPM | RESPIRATION RATE: 16 BRPM | DIASTOLIC BLOOD PRESSURE: 52 MMHG | BODY MASS INDEX: 36.72 KG/M2 | TEMPERATURE: 98.6 F | OXYGEN SATURATION: 98 %

## 2023-06-19 DIAGNOSIS — K21.9 GASTROESOPHAGEAL REFLUX DISEASE WITHOUT ESOPHAGITIS: ICD-10-CM

## 2023-06-19 LAB
EXT PREGNANCY TEST URINE: NEGATIVE
EXT. CONTROL: NORMAL

## 2023-06-19 PROCEDURE — 43239 EGD BIOPSY SINGLE/MULTIPLE: CPT | Performed by: INTERNAL MEDICINE

## 2023-06-19 PROCEDURE — 81025 URINE PREGNANCY TEST: CPT | Performed by: ANESTHESIOLOGY

## 2023-06-19 PROCEDURE — 88305 TISSUE EXAM BY PATHOLOGIST: CPT | Performed by: PATHOLOGY

## 2023-06-19 RX ORDER — SODIUM CHLORIDE, SODIUM LACTATE, POTASSIUM CHLORIDE, CALCIUM CHLORIDE 600; 310; 30; 20 MG/100ML; MG/100ML; MG/100ML; MG/100ML
INJECTION, SOLUTION INTRAVENOUS CONTINUOUS PRN
Status: DISCONTINUED | OUTPATIENT
Start: 2023-06-19 | End: 2023-06-19

## 2023-06-19 RX ORDER — LIDOCAINE HYDROCHLORIDE 20 MG/ML
INJECTION, SOLUTION EPIDURAL; INFILTRATION; INTRACAUDAL; PERINEURAL AS NEEDED
Status: DISCONTINUED | OUTPATIENT
Start: 2023-06-19 | End: 2023-06-19

## 2023-06-19 RX ORDER — PROPOFOL 10 MG/ML
INJECTION, EMULSION INTRAVENOUS AS NEEDED
Status: DISCONTINUED | OUTPATIENT
Start: 2023-06-19 | End: 2023-06-19

## 2023-06-19 RX ADMIN — PROPOFOL 100 MG: 10 INJECTION, EMULSION INTRAVENOUS at 10:58

## 2023-06-19 RX ADMIN — LIDOCAINE HYDROCHLORIDE 100 MG: 20 INJECTION, SOLUTION EPIDURAL; INFILTRATION; INTRACAUDAL; PERINEURAL at 10:58

## 2023-06-19 RX ADMIN — PROPOFOL 50 MG: 10 INJECTION, EMULSION INTRAVENOUS at 11:00

## 2023-06-19 RX ADMIN — PROPOFOL 50 MG: 10 INJECTION, EMULSION INTRAVENOUS at 11:01

## 2023-06-19 RX ADMIN — SODIUM CHLORIDE, SODIUM LACTATE, POTASSIUM CHLORIDE, AND CALCIUM CHLORIDE: .6; .31; .03; .02 INJECTION, SOLUTION INTRAVENOUS at 10:58

## 2023-06-19 NOTE — INTERVAL H&P NOTE
H&P reviewed  After examining the patient I find no changes in the patients condition since the H&P had been written      Vitals:    06/19/23 1008   BP: 117/69   Pulse: 72   Resp: 19   Temp: 97 7 °F (36 5 °C)   SpO2: 100%

## 2023-06-19 NOTE — ANESTHESIA PREPROCEDURE EVALUATION
Procedure:  EGD    Relevant Problems   ANESTHESIA (within normal limits)      CARDIO (within normal limits)      ENDO   (+) Hypothyroidism      GI/HEPATIC  NPO confirmed  BMI 36 7   (+) Dysphagia   (+) Gastroesophageal reflux disease without esophagitis      /RENAL (within normal limits)      HEMATOLOGY (within normal limits)      PULMONARY (within normal limits)   (-) URI (upper respiratory infection)      Allergies   Allergen Reactions   • Penicillin G Anaphylaxis   • Other      Annotation - [id number]: allergic to the powder on latex gloves     Social History     Tobacco Use   • Smoking status: Never   • Smokeless tobacco: Never   Vaping Use   • Vaping Use: Never used   Substance Use Topics   • Alcohol use: Not Currently     Comment: VERY RARELY   • Drug use: No     Current Outpatient Medications   Medication Instructions   • lansoprazole (PREVACID) 30 mg, Oral, Daily   • levocetirizine (XYZAL) 5 mg, Oral, Every evening   • Norethin Ace-Eth Estrad-FE 1-20 MG-MCG(24) CHEW CHEW 1 TABLET BY MOUTH EVERY DAY   • Synthroid 125 MCG tablet 1 tab daily     Lab Results   Component Value Date    WBC 9 12 08/18/2022    HGB 14 9 08/18/2022    HCT 44 2 08/18/2022     08/18/2022    SODIUM 136 08/18/2022    K 4 1 08/18/2022     08/18/2022    CO2 22 08/18/2022    BUN 17 08/18/2022    CREATININE 0 92 08/18/2022    HGBA1C 5 4 05/31/2023    AST 54 (H) 08/18/2022    ALT 24 08/18/2022    ALKPHOS 66 08/18/2022    TBILI 0 50 08/18/2022    ALB 3 9 08/18/2022     Vitals:    06/19/23 1008   BP: 117/69   Pulse: 72   Resp: 19   Temp: 97 7 °F (36 5 °C)   SpO2: 100%       Physical Exam    Airway    Mallampati score: II  TM Distance: >3 FB  Neck ROM: full     Dental   No notable dental hx     Cardiovascular  Cardiovascular exam normal    Pulmonary  Pulmonary exam normal     Other Findings        Anesthesia Plan  ASA Score- 2     Anesthesia Type- IV sedation with anesthesia with ASA Monitors           Additional Monitors:   Airway Plan:     Comment: O2 mask, natural airway, EtCO2 monitor  Plan Factors-Exercise tolerance (METS): >4 METS  Chart reviewed  Existing labs reviewed  Patient summary reviewed  Patient is not a current smoker  Induction- intravenous  Postoperative Plan-     Informed Consent- Anesthetic plan and risks discussed with patient  I personally reviewed this patient with the CRNA  Discussed and agreed on the Anesthesia Plan with the CRNA  Monika Mota

## 2023-06-19 NOTE — ANESTHESIA POSTPROCEDURE EVALUATION
Post-Op Assessment Note    CV Status:  Stable    Pain management: adequate     Mental Status:  Alert and awake   Hydration Status:  Euvolemic   PONV Controlled:  Controlled   Airway Patency:  Patent      Post Op Vitals Reviewed: Yes      Staff: CRNA         No notable events documented      BP 93/52 (06/19/23 1107)    Temp 98 6 °F (37 °C) (06/19/23 1107)    Pulse 80 (06/19/23 1107)   Resp 16 (06/19/23 1107)    SpO2 95 % (06/19/23 1107)

## 2023-06-22 PROCEDURE — 88305 TISSUE EXAM BY PATHOLOGIST: CPT | Performed by: PATHOLOGY

## 2023-06-27 ENCOUNTER — TELEPHONE (OUTPATIENT)
Dept: GASTROENTEROLOGY | Facility: CLINIC | Age: 53
End: 2023-06-27

## 2023-06-27 NOTE — TELEPHONE ENCOUNTER
Called and spoke iwth patient in regards to her biopsy results as per Dr Breanna Heard  Pt voiced understanding and has nor further questions

## 2023-06-27 NOTE — TELEPHONE ENCOUNTER
----- Message from Francis Machado MD sent at 6/27/2023  8:59 AM EDT -----  Pls review with the patient

## 2023-09-18 DIAGNOSIS — E03.9 ACQUIRED HYPOTHYROIDISM: ICD-10-CM

## 2023-09-18 RX ORDER — LEVOTHYROXINE SODIUM 125 MCG
TABLET ORAL
Qty: 90 TABLET | Refills: 0 | Status: SHIPPED | OUTPATIENT
Start: 2023-09-18

## 2023-12-21 ENCOUNTER — OFFICE VISIT (OUTPATIENT)
Dept: ENDOCRINOLOGY | Facility: CLINIC | Age: 53
End: 2023-12-21
Payer: COMMERCIAL

## 2023-12-21 VITALS
DIASTOLIC BLOOD PRESSURE: 70 MMHG | WEIGHT: 210.6 LBS | HEART RATE: 78 BPM | SYSTOLIC BLOOD PRESSURE: 110 MMHG | HEIGHT: 63 IN | BODY MASS INDEX: 37.32 KG/M2

## 2023-12-21 DIAGNOSIS — E03.9 ACQUIRED HYPOTHYROIDISM: ICD-10-CM

## 2023-12-21 DIAGNOSIS — E04.1 THYROID NODULE: Primary | ICD-10-CM

## 2023-12-21 PROCEDURE — 99214 OFFICE O/P EST MOD 30 MIN: CPT | Performed by: PHYSICIAN ASSISTANT

## 2023-12-21 RX ORDER — LEVOTHYROXINE SODIUM 125 MCG
TABLET ORAL
Qty: 90 TABLET | Refills: 3 | Status: SHIPPED | OUTPATIENT
Start: 2023-12-21

## 2023-12-21 RX ORDER — ERGOCALCIFEROL 1.25 MG/1
50000 CAPSULE ORAL
COMMUNITY

## 2023-12-21 RX ORDER — ONDANSETRON 4 MG/1
4 TABLET, ORALLY DISINTEGRATING ORAL EVERY 8 HOURS PRN
COMMUNITY
Start: 2023-12-14 | End: 2023-12-24

## 2023-12-21 NOTE — PROGRESS NOTES
Established Patient Progress Note       Chief Complaint   Patient presents with   • Hypothyroidism        Impression & Plan:    Problem List Items Addressed This Visit        Endocrine    Hypothyroidism     Continue Synthroid. Will monitor thyroid function closely over the next year following gastric bypass surgery.          Relevant Medications    Synthroid 125 MCG tablet    Other Relevant Orders    TSH, 3rd generation    T4, free    US thyroid    Thyroid nodule - Primary     Repeat Ultrasound 4/2024.          Relevant Medications    Synthroid 125 MCG tablet    Other Relevant Orders    US thyroid         Orders Placed This Encounter   Procedures   • US thyroid     Standing Status:   Future     Standing Expiration Date:   12/21/2027     Scheduling Instructions:      No prep required. Please bring your insurance cards, a form of photo ID and a list of your medications with you. Arrive 15 minutes prior to your appointment time in order to register.            To schedule this appointment, please contact Central Scheduling at [phone].   • TSH, 3rd generation     This is a patient instruction: This test is non-fasting. Please drink two glasses of water morning of bloodwork.        Standing Status:   Future     Standing Expiration Date:   12/21/2024   • T4, free     Standing Status:   Future     Standing Expiration Date:   12/21/2024         History of Present Illness:     Mary Painter is a 53 y.o. female with a history of  Hypothyroidism, Vitamin D Deficiency, and thyroid nodule.     For Hypothyroidism, she is taking Synthroid 125mcg daily.  She is feeling well overall.     For the thyroid nodule, she had FNA in 2019 of nodule which showed no malignancy. Nodule was 1.4 x 0.9 x 0.8cm in size.   Most  ultrasound 4/2022 showed stable nodule.   She denies dysphagia.     For Vitamin D Deficiency, taking weekly RX supplement.     She had  gastric bypass surgery 1 week ago and will be following with surgeon later  today.     Patient Active Problem List   Diagnosis   • Hypothyroidism   • Dysphagia   • Thyroid nodule   • Gastroesophageal reflux disease without esophagitis      Past Medical History:   Diagnosis Date   • Disease of thyroid gland    • Hashimoto's disease    • Hypothyroidism       Past Surgical History:   Procedure Laterality Date   •  SECTION     • US GUIDED THYROID BIOPSY  2019   • WISDOM TOOTH EXTRACTION        Family History   Problem Relation Age of Onset   • Thyroid disease unspecified Mother    • Cancer Father    • Thyroid disease unspecified Sister    • Colon cancer Maternal Grandmother      Social History     Tobacco Use   • Smoking status: Never   • Smokeless tobacco: Never   Substance Use Topics   • Alcohol use: Not Currently     Comment: VERY RARELY     Allergies   Allergen Reactions   • Penicillin G Anaphylaxis   • Other      Annotation - [id number]: allergic to the powder on latex gloves       Current Outpatient Medications:   •  ergocalciferol (VITAMIN D2) 50,000 units, Take 50,000 Units by mouth, Disp: , Rfl:   •  lansoprazole (PREVACID) 30 mg capsule, Take 30 mg by mouth daily, Disp: , Rfl:   •  ondansetron (ZOFRAN-ODT) 4 mg disintegrating tablet, Take 4 mg by mouth every 8 (eight) hours as needed, Disp: , Rfl:   •  Synthroid 125 MCG tablet, 1 tab daily, Disp: 90 tablet, Rfl: 3    Review of Systems   Constitutional:  Negative for activity change, appetite change, chills, diaphoresis, fatigue, fever and unexpected weight change.   HENT:  Negative for trouble swallowing and voice change.    Eyes:  Negative for visual disturbance.   Respiratory:  Negative for shortness of breath.    Cardiovascular:  Negative for chest pain and palpitations.   Gastrointestinal:  Negative for abdominal pain, constipation and diarrhea.   Endocrine: Positive for heat intolerance (since stopping the pill). Negative for cold intolerance, polydipsia, polyphagia and polyuria.   Genitourinary:  Negative for  "frequency and menstrual problem.   Musculoskeletal:  Negative for arthralgias and myalgias.   Skin:  Negative for rash.   Allergic/Immunologic: Negative for food allergies.   Neurological:  Negative for dizziness and tremors.   Hematological:  Negative for adenopathy.   Psychiatric/Behavioral:  Negative for sleep disturbance.    All other systems reviewed and are negative.      Physical Exam:  Body mass index is 37.31 kg/m².  /70   Pulse 78   Ht 5' 3\" (1.6 m)   Wt 95.5 kg (210 lb 9.6 oz)   BMI 37.31 kg/m²    Wt Readings from Last 3 Encounters:   12/21/23 95.5 kg (210 lb 9.6 oz)   06/19/23 94 kg (207 lb 3.7 oz)   06/01/23 92.5 kg (204 lb)       Physical Exam  Vitals reviewed.   Constitutional:       General: She is not in acute distress.     Appearance: Normal appearance. She is well-developed. She is not toxic-appearing.   HENT:      Head: Normocephalic and atraumatic.   Eyes:      Conjunctiva/sclera: Conjunctivae normal.      Pupils: Pupils are equal, round, and reactive to light.   Neck:      Thyroid: No thyromegaly.   Cardiovascular:      Rate and Rhythm: Normal rate and regular rhythm.      Heart sounds: Normal heart sounds.   Pulmonary:      Effort: Pulmonary effort is normal. No respiratory distress.      Breath sounds: Normal breath sounds. No wheezing or rales.   Abdominal:      General: Bowel sounds are normal. There is no distension.      Palpations: Abdomen is soft.      Tenderness: There is no abdominal tenderness.   Musculoskeletal:         General: Normal range of motion.      Cervical back: Normal range of motion and neck supple.   Skin:     General: Skin is warm and dry.   Neurological:      Mental Status: She is alert and oriented to person, place, and time.   Psychiatric:         Mood and Affect: Mood normal.         Behavior: Behavior normal.         Labs:     Lab Results   Component Value Date    CREATININE 0.92 08/18/2022    BUN 17 08/18/2022    K 4.1 08/18/2022     08/18/2022    " CO2 22 08/18/2022     eGFR   Date Value Ref Range Status   08/18/2022 72 ml/min/1.73sq m Final       Lab Results   Component Value Date    HDL 78 08/18/2022    TRIG 68 08/18/2022       Lab Results   Component Value Date    ALT 24 08/18/2022    AST 54 (H) 08/18/2022    ALKPHOS 66 08/18/2022       Lab Results   Component Value Date    FREET4 1.20 12/19/2022       There are no Patient Instructions on file for this visit.    Discussed with the patient and all questioned fully answered. She will call me if any problems arise.    Follow-up appointment in 12 months.     Counseled patient on diagnostic results, prognosis, risk and benefit of treatment options, instruction for management, importance of treatment compliance, Risk  factor reduction and impressions    Rose Gonzáles PA-C

## 2024-03-22 ENCOUNTER — HOSPITAL ENCOUNTER (OUTPATIENT)
Dept: ULTRASOUND IMAGING | Facility: HOSPITAL | Age: 54
End: 2024-03-22
Payer: COMMERCIAL

## 2024-03-22 DIAGNOSIS — E04.1 THYROID NODULE: ICD-10-CM

## 2024-03-22 DIAGNOSIS — E03.9 ACQUIRED HYPOTHYROIDISM: ICD-10-CM

## 2024-03-22 PROCEDURE — 76536 US EXAM OF HEAD AND NECK: CPT

## 2024-04-18 ENCOUNTER — TELEPHONE (OUTPATIENT)
Age: 54
End: 2024-04-18

## 2024-09-04 ENCOUNTER — PREP FOR PROCEDURE (OUTPATIENT)
Age: 54
End: 2024-09-04

## 2024-09-04 ENCOUNTER — OFFICE VISIT (OUTPATIENT)
Age: 54
End: 2024-09-04
Payer: COMMERCIAL

## 2024-09-04 VITALS
OXYGEN SATURATION: 96 % | DIASTOLIC BLOOD PRESSURE: 70 MMHG | WEIGHT: 141 LBS | BODY MASS INDEX: 24.98 KG/M2 | SYSTOLIC BLOOD PRESSURE: 108 MMHG | HEIGHT: 63 IN | HEART RATE: 82 BPM

## 2024-09-04 DIAGNOSIS — Z12.11 SCREENING FOR COLON CANCER: Primary | ICD-10-CM

## 2024-09-04 DIAGNOSIS — Z83.719 FAMILY HISTORY OF COLONIC POLYPS: ICD-10-CM

## 2024-09-04 DIAGNOSIS — Z80.0 FAMILY HISTORY OF COLON CANCER: Primary | ICD-10-CM

## 2024-09-04 DIAGNOSIS — Z91.89 HIGH RISK FOR COLON CANCER: ICD-10-CM

## 2024-09-04 PROCEDURE — 99203 OFFICE O/P NEW LOW 30 MIN: CPT | Performed by: COLON & RECTAL SURGERY

## 2024-09-04 RX ORDER — NORETHINDRONE ACETATE AND ETHINYL ESTRADIOL, ETHINYL ESTRADIOL AND FERROUS FUMARATE 1MG-10(24)
1 KIT ORAL DAILY
COMMUNITY

## 2024-09-04 RX ORDER — CETIRIZINE HYDROCHLORIDE 10 MG/1
10 TABLET, CHEWABLE ORAL DAILY
COMMUNITY

## 2024-09-04 RX ORDER — SODIUM PICOSULFATE, MAGNESIUM OXIDE, AND ANHYDROUS CITRIC ACID 12; 3.5; 1 G/175ML; G/175ML; MG/175ML
LIQUID ORAL
Qty: 350 ML | Refills: 0 | Status: SHIPPED | OUTPATIENT
Start: 2024-09-04

## 2024-09-04 NOTE — H&P (VIEW-ONLY)
"Ambulatory Visit  Name: Mary Painter      : 1970      MRN: [id number]  Encounter Provider: Miquel Herrera MD  Encounter Date: 2024   Encounter department: . [facility] [place]    Assessment & Plan   1. Family history of colon cancer  -     Colonoscopy; Future; Expected date: 2024  2. High risk for colon cancer  -     Colonoscopy; Future; Expected date: 2024  3. Family history of colonic polyps  -     Colonoscopy; Future; Expected date: 2024      History of Present Illness     Mary Painter is a 53 y.o. female who presents for screening colonoscopy.  The patient is in a high risk category for developing colorectal cancer.  Her last colonoscopic exam was .  The patient's mother is with a history of colonic polyps, in addition, the patient has 5 of her mothers siblings with colon and rectal cancer and colonic polyps.    Review of Systems   Constitutional:  Negative for chills and fever.   HENT:  Negative for ear pain and sore throat.    Eyes:  Negative for pain and visual disturbance.   Respiratory:  Negative for cough and shortness of breath.    Cardiovascular:  Negative for chest pain and palpitations.   Gastrointestinal:  Negative for abdominal pain and vomiting.   Genitourinary:  Negative for dysuria and hematuria.   Musculoskeletal:  Negative for arthralgias and back pain.   Skin:  Negative for color change and rash.   Neurological:  Negative for seizures and syncope.   All other systems reviewed and are negative.    Medical History Reviewed by provider this encounter:  Tobacco  Allergies  Meds  Problems  Med Hx  Surg Hx  Fam Hx       Objective     /70   Pulse 82   Ht 5' 3\" (1.6 m)   Wt 64 kg (141 lb)   SpO2 96%   BMI 24.98 kg/m²     Physical Exam  Vitals and nursing note reviewed.   Constitutional:       General: She is not in acute distress.     Appearance: Normal appearance. She is well-developed.   HENT:      Head: " Normocephalic and atraumatic.   Eyes:      Conjunctiva/sclera: Conjunctivae normal.   Cardiovascular:      Rate and Rhythm: Normal rate and regular rhythm.      Heart sounds: No murmur heard.  Pulmonary:      Effort: Pulmonary effort is normal. No respiratory distress.      Breath sounds: Normal breath sounds.   Abdominal:      Palpations: Abdomen is soft.      Tenderness: There is no abdominal tenderness.   Musculoskeletal:         General: No swelling.      Cervical back: Neck supple.   Skin:     General: Skin is warm and dry.      Capillary Refill: Capillary refill takes less than 2 seconds.   Neurological:      Mental Status: She is alert and oriented to person, place, and time.   Psychiatric:         Mood and Affect: Mood normal.       Administrative Statements

## 2024-09-04 NOTE — PATIENT INSTRUCTIONS
Scheduled date of colonoscopy (as of today): 9/23/24  Physician performing colonoscopy: Sharon  Location of colonoscopy: [facility]  Bowel prep reviewed with patient: Clenpiq  Instructions reviewed with patient by: Emilee TAVAREZ  Clearances:

## 2024-09-04 NOTE — PROGRESS NOTES
"Ambulatory Visit  Name: Mary Painter      : 1970      MRN: [id number]  Encounter Provider: Miquel Herrera MD  Encounter Date: 2024   Encounter department: . [facility]    Assessment & Plan   1. Family history of colon cancer  -     Colonoscopy; Future; Expected date: 2024  2. High risk for colon cancer  -     Colonoscopy; Future; Expected date: 2024  3. Family history of colonic polyps  -     Colonoscopy; Future; Expected date: 2024      History of Present Illness     Mary Painter is a 53 y.o. female who presents for screening colonoscopy.  The patient is in a high risk category for developing colorectal cancer.  Her last colonoscopic exam was .  The patient's mother is with a history of colonic polyps, in addition, the patient has 5 of her mothers siblings with colon and rectal cancer and colonic polyps.    Review of Systems   Constitutional:  Negative for chills and fever.   HENT:  Negative for ear pain and sore throat.    Eyes:  Negative for pain and visual disturbance.   Respiratory:  Negative for cough and shortness of breath.    Cardiovascular:  Negative for chest pain and palpitations.   Gastrointestinal:  Negative for abdominal pain and vomiting.   Genitourinary:  Negative for dysuria and hematuria.   Musculoskeletal:  Negative for arthralgias and back pain.   Skin:  Negative for color change and rash.   Neurological:  Negative for seizures and syncope.   All other systems reviewed and are negative.    Medical History Reviewed by provider this encounter:  Tobacco  Allergies  Meds  Problems  Med Hx  Surg Hx  Fam Hx       Objective     /70   Pulse 82   Ht 5' 3\" (1.6 m)   Wt 64 kg (141 lb)   SpO2 96%   BMI 24.98 kg/m²     Physical Exam  Vitals and nursing note reviewed.   Constitutional:       General: She is not in acute distress.     Appearance: Normal appearance. She is well-developed.   HENT:      Head: " Normocephalic and atraumatic.   Eyes:      Conjunctiva/sclera: Conjunctivae normal.   Cardiovascular:      Rate and Rhythm: Normal rate and regular rhythm.      Heart sounds: No murmur heard.  Pulmonary:      Effort: Pulmonary effort is normal. No respiratory distress.      Breath sounds: Normal breath sounds.   Abdominal:      Palpations: Abdomen is soft.      Tenderness: There is no abdominal tenderness.   Musculoskeletal:         General: No swelling.      Cervical back: Neck supple.   Skin:     General: Skin is warm and dry.      Capillary Refill: Capillary refill takes less than 2 seconds.   Neurological:      Mental Status: She is alert and oriented to person, place, and time.   Psychiatric:         Mood and Affect: Mood normal.       Administrative Statements

## 2024-09-23 ENCOUNTER — ANESTHESIA EVENT (OUTPATIENT)
Dept: GASTROENTEROLOGY | Facility: HOSPITAL | Age: 54
End: 2024-09-23
Payer: COMMERCIAL

## 2024-09-23 ENCOUNTER — ANESTHESIA (OUTPATIENT)
Dept: GASTROENTEROLOGY | Facility: HOSPITAL | Age: 54
End: 2024-09-23
Payer: COMMERCIAL

## 2024-09-23 ENCOUNTER — HOSPITAL ENCOUNTER (OUTPATIENT)
Dept: GASTROENTEROLOGY | Facility: HOSPITAL | Age: 54
Setting detail: OUTPATIENT SURGERY
Discharge: HOME | End: 2024-09-23
Attending: COLON & RECTAL SURGERY
Payer: COMMERCIAL

## 2024-09-23 VITALS
BODY MASS INDEX: 24.96 KG/M2 | OXYGEN SATURATION: 100 % | TEMPERATURE: 98.6 F | WEIGHT: 140.87 LBS | RESPIRATION RATE: 20 BRPM | SYSTOLIC BLOOD PRESSURE: 118 MMHG | HEIGHT: 63 IN | DIASTOLIC BLOOD PRESSURE: 64 MMHG | HEART RATE: 51 BPM

## 2024-09-23 DIAGNOSIS — Z80.0 FAMILY HISTORY OF COLON CANCER: ICD-10-CM

## 2024-09-23 DIAGNOSIS — Z91.89 HIGH RISK FOR COLON CANCER: ICD-10-CM

## 2024-09-23 DIAGNOSIS — Z83.719 FAMILY HISTORY OF COLONIC POLYPS: ICD-10-CM

## 2024-09-23 LAB
EXT PREGNANCY TEST URINE: NEGATIVE
EXT. CONTROL: NORMAL

## 2024-09-23 PROCEDURE — 81025 URINE PREGNANCY TEST: CPT | Performed by: COLON & RECTAL SURGERY

## 2024-09-23 PROCEDURE — G0105 COLORECTAL SCRN; HI RISK IND: HCPCS | Performed by: COLON & RECTAL SURGERY

## 2024-09-23 RX ORDER — SODIUM CHLORIDE, SODIUM LACTATE, POTASSIUM CHLORIDE, CALCIUM CHLORIDE 600; 310; 30; 20 MG/100ML; MG/100ML; MG/100ML; MG/100ML
INJECTION, SOLUTION INTRAVENOUS CONTINUOUS PRN
Status: DISCONTINUED | OUTPATIENT
Start: 2024-09-23 | End: 2024-09-23

## 2024-09-23 RX ORDER — LIDOCAINE HYDROCHLORIDE 20 MG/ML
INJECTION, SOLUTION EPIDURAL; INFILTRATION; INTRACAUDAL; PERINEURAL AS NEEDED
Status: DISCONTINUED | OUTPATIENT
Start: 2024-09-23 | End: 2024-09-23

## 2024-09-23 RX ORDER — PROPOFOL 10 MG/ML
INJECTION, EMULSION INTRAVENOUS AS NEEDED
Status: DISCONTINUED | OUTPATIENT
Start: 2024-09-23 | End: 2024-09-23

## 2024-09-23 RX ORDER — SODIUM CHLORIDE, SODIUM LACTATE, POTASSIUM CHLORIDE, CALCIUM CHLORIDE 600; 310; 30; 20 MG/100ML; MG/100ML; MG/100ML; MG/100ML
75 INJECTION, SOLUTION INTRAVENOUS CONTINUOUS
Status: DISCONTINUED | OUTPATIENT
Start: 2024-09-23 | End: 2024-09-27 | Stop reason: HOSPADM

## 2024-09-23 RX ADMIN — SODIUM CHLORIDE, SODIUM LACTATE, POTASSIUM CHLORIDE, AND CALCIUM CHLORIDE 75 ML/HR: .6; .31; .03; .02 INJECTION, SOLUTION INTRAVENOUS at 10:33

## 2024-09-23 RX ADMIN — SODIUM CHLORIDE, SODIUM LACTATE, POTASSIUM CHLORIDE, AND CALCIUM CHLORIDE: .6; .31; .03; .02 INJECTION, SOLUTION INTRAVENOUS at 11:24

## 2024-09-23 RX ADMIN — PROPOFOL 100 MG: 10 INJECTION, EMULSION INTRAVENOUS at 11:33

## 2024-09-23 RX ADMIN — PROPOFOL 100 MG: 10 INJECTION, EMULSION INTRAVENOUS at 11:45

## 2024-09-23 RX ADMIN — LIDOCAINE HYDROCHLORIDE 50 MG: 20 INJECTION, SOLUTION EPIDURAL; INFILTRATION; INTRACAUDAL; PERINEURAL at 11:25

## 2024-09-23 RX ADMIN — PROPOFOL 100 MG: 10 INJECTION, EMULSION INTRAVENOUS at 11:25

## 2024-09-23 NOTE — ANESTHESIA POSTPROCEDURE EVALUATION
Post-Op Assessment Note    CV Status:  Stable  Pain Score: 0    Pain management: adequate       Mental Status:  Sleepy   Hydration Status:  Stable   PONV Controlled:  None   Airway Patency:  Patent     Post Op Vitals Reviewed: Yes    No anethesia notable event occurred.    Staff: CRNA               BP   98/67   Temp   98   Pulse  67   Resp   18   SpO2   98

## 2024-09-23 NOTE — ANESTHESIA PREPROCEDURE EVALUATION
Procedure:  COLONOSCOPY    Relevant Problems   ENDO   (+) Hypothyroidism      GI/HEPATIC   (+) Dysphagia   (+) Gastroesophageal reflux disease without esophagitis        Physical Exam    Airway    Mallampati score: III  TM Distance: >3 FB  Neck ROM: full     Dental   No notable dental hx     Cardiovascular  Cardiovascular exam normal    Pulmonary  Pulmonary exam normal     Other Findings  post-pubertal.      Anesthesia Plan  ASA Score- 2     Anesthesia Type- IV sedation with anesthesia with ASA Monitors.         Additional Monitors:     Airway Plan:            Plan Factors-Exercise tolerance (METS): >4 METS.    Chart reviewed.    Patient summary reviewed.    Patient is not a current smoker.              Induction- intravenous.    Postoperative Plan-         Informed Consent- Anesthetic plan and risks discussed with patient.

## 2024-09-23 NOTE — INTERVAL H&P NOTE
H&P reviewed. After examining the patient I find no changes in the patients condition since the H&P had been written.    Vitals:    09/23/24 1000   BP: 95/51   Pulse: 59   Resp: 18   Temp: 98.4 °F (36.9 °C)   SpO2: 100%

## 2024-12-19 ENCOUNTER — RESULTS FOLLOW-UP (OUTPATIENT)
Dept: ENDOCRINOLOGY | Facility: CLINIC | Age: 54
End: 2024-12-19

## 2024-12-19 ENCOUNTER — APPOINTMENT (OUTPATIENT)
Dept: LAB | Facility: HOSPITAL | Age: 54
End: 2024-12-19
Payer: COMMERCIAL

## 2024-12-19 DIAGNOSIS — E03.9 ACQUIRED HYPOTHYROIDISM: ICD-10-CM

## 2024-12-19 LAB
T4 FREE SERPL-MCNC: 0.97 NG/DL (ref 0.61–1.12)
TSH SERPL DL<=0.05 MIU/L-ACNC: 1.42 UIU/ML (ref 0.45–4.5)

## 2024-12-19 PROCEDURE — 84439 ASSAY OF FREE THYROXINE: CPT

## 2024-12-19 PROCEDURE — 36415 COLL VENOUS BLD VENIPUNCTURE: CPT

## 2024-12-19 PROCEDURE — 84443 ASSAY THYROID STIM HORMONE: CPT

## 2024-12-19 NOTE — PROGRESS NOTES
Established Patient Progress Note      Chief Complaint   Patient presents with    Hypothyroidism        Impression & Plan:    Assessment & Plan  Acquired hypothyroidism  TSH: 1.420  Continue levothyroxine 125mcg daily  Taking correctly  Discussed overall stability - patient to discuss with PCP ability to follow and manage given normal TSH and T4. She has some hesitancy to change providers, had a previous bad experience with another office.   Orders:    TSH, 3rd generation with Free T4 reflex; Future    TSH, 3rd generation with Free T4 reflex; Future    Synthroid 125 MCG tablet; 1 tab daily    Thyroid nodule  Ultrasound March 2024 reveals minimal increase in left mid gland nodule, stable with previous nonmalignant biopsy results.  Can monitor every 12 to 24 months with repeat ultrasound  Orders:    TSH, 3rd generation with Free T4 reflex; Future    TSH, 3rd generation with Free T4 reflex; Future    US thyroid; Future      History of Present Illness:   Mary Painter is a 54 y.o. female with history of hypothyroidism, vitamin D deficiency and thyroid nodule.  She is maintained on Synthroid 125 mcg daily. She expresses concern over a previously abnormal T4. Discussed that recent TSH and reflex are within normal limits. Reports 108lb weight loss over the last year due to bariatric surgery. Feels well overall. Improvement in sleep, energy and fatigue. Denies any change in voice, swallowing, or difficulty breathing.     She had FNA performed in 2019 of nodule which was negative for malignancy.  Ultrasound stable compared to most recent testing, slight increase on ultrasound in 2024 from 2019.  Continue annual thyroid imaging.      Patient Active Problem List   Diagnosis    Hypothyroidism    Dysphagia    Thyroid nodule    Gastroesophageal reflux disease without esophagitis      Past Medical History:   Diagnosis Date    Disease of thyroid gland     Hashimoto's disease     History of gastric bypass 12/2023     "Hypothyroidism       Past Surgical History:   Procedure Laterality Date     SECTION      GASTRIC BYPASS  2023    HERNIA REPAIR  23    US GUIDED THYROID BIOPSY  2019    WISDOM TOOTH EXTRACTION        Family History   Problem Relation Age of Onset    Thyroid disease unspecified Mother     Cancer Father     Thyroid disease unspecified Sister     Colon cancer Maternal Grandmother      Social History     Tobacco Use    Smoking status: Never    Smokeless tobacco: Never   Substance Use Topics    Alcohol use: Not Currently     Comment: VERY RARELY     Allergies   Allergen Reactions    Penicillin G Anaphylaxis    Other      Annotation - [id number]: allergic to the powder on latex gloves         Current Outpatient Medications:     cetirizine (ZyrTEC) 10 MG chewable tablet, Chew 10 mg daily, Disp: , Rfl:     Lo Loestrin Fe 1 MG-10 MCG / 10 MCG TABS, Take 1 tablet by mouth daily, Disp: , Rfl:     Synthroid 125 MCG tablet, 1 tab daily, Disp: 90 tablet, Rfl: 3    ondansetron (ZOFRAN-ODT) 4 mg disintegrating tablet, Take 4 mg by mouth every 8 (eight) hours as needed, Disp: , Rfl:     Review of Systems   Constitutional:  Negative for fatigue.   HENT:  Negative for trouble swallowing and voice change.    Endocrine: Negative for cold intolerance and heat intolerance.       Physical Exam:  Body mass index is 25.15 kg/m².  /68 (BP Location: Right arm, Patient Position: Sitting, Cuff Size: Adult)   Ht 5' 3\" (1.6 m)   Wt 64.4 kg (142 lb)   BMI 25.15 kg/m²    Wt Readings from Last 3 Encounters:   24 64.4 kg (142 lb)   24 63.9 kg (140 lb 14 oz)   24 64 kg (141 lb)       Physical Exam  Constitutional:       General: She is not in acute distress.     Appearance: She is well-developed.   HENT:      Head: Normocephalic and atraumatic.   Eyes:      General: No scleral icterus.     Conjunctiva/sclera: Conjunctivae normal.   Neck:      Thyroid: Thyromegaly present.      Comments: Nodules " noted  Pulmonary:      Effort: Pulmonary effort is normal. No respiratory distress.   Skin:     Findings: No rash.   Neurological:      Mental Status: She is alert.   Psychiatric:         Mood and Affect: Mood and affect normal.         Labs:   Lab Results   Component Value Date    HGBA1C 5.2 06/27/2024    HGBA1C 5.4 05/31/2023    HGBA1C 5.2 08/18/2022     Lab Results   Component Value Date    CREATININE 0.83 12/05/2024    CREATININE 0.80 06/27/2024    CREATININE 0.87 02/28/2024    BUN 19 12/05/2024    K 3.7 12/05/2024     12/05/2024    CO2 25 12/05/2024     eGFRcr   Date Value Ref Range Status   12/05/2024 84 >59 Final     Lab Results   Component Value Date    HDL 78 08/18/2022    TRIG 68 08/18/2022     Lab Results   Component Value Date    ALT 13 12/05/2024    AST 32 12/05/2024    ALKPHOS 63 12/05/2024     Lab Results   Component Value Date    JUG3JDQMQMJP 1.420 12/19/2024    YGF7RZSGWQMI 1.890 12/19/2022    ZZA2IFURZYPQ 0.787 08/17/2017     Lab Results   Component Value Date    FREET4 0.97 12/19/2024       Orders Placed This Encounter   Procedures    US thyroid     Standing Status:   Future     Expected Date:   3/1/2025     Expiration Date:   12/19/2028     Scheduling Instructions:      No prep required.        Please bring your insurance cards and a form of photo ID.  Bring your order/script for the test if from a non - [facility] provider.        Arrive 15 minutes prior to your appointment time to register.            To schedule this appointment, please contact Central Scheduling at [phone].          TSH, 3rd generation with Free T4 reflex     Standing Status:   Future     Expected Date:   6/19/2025     Expiration Date:   12/19/2025    TSH, 3rd generation with Free T4 reflex     Standing Status:   Future     Expected Date:   12/19/2025     Expiration Date:   12/19/2025       Patient Instructions   Ensure you are taking your thyroid medication at least one hour prior to meals, on an empty stomach  and 4 hours before any vitamins/supplements  Obtain labs prior to follow-up appointment  Hold all biotin-containing supplements for three days prior to thyroid lab draws    Discussed with the patient and all questioned fully answered. She will call me if any problems arise.    Jessica Wu PA-C

## 2024-12-19 NOTE — ASSESSMENT & PLAN NOTE
Ultrasound March 2024 reveals minimal increase in left mid gland nodule, stable with previous nonmalignant biopsy results.  Can monitor every 12 to 24 months with repeat ultrasound  Orders:    TSH, 3rd generation with Free T4 reflex; Future    TSH, 3rd generation with Free T4 reflex; Future    US thyroid; Future

## 2024-12-19 NOTE — ASSESSMENT & PLAN NOTE
TSH: 1.420  Continue levothyroxine 125mcg daily  Taking correctly  Discussed overall stability - patient to discuss with PCP ability to follow and manage given normal TSH and T4. She has some hesitancy to change providers, had a previous bad experience with another office.   Orders:    TSH, 3rd generation with Free T4 reflex; Future    TSH, 3rd generation with Free T4 reflex; Future    Synthroid 125 MCG tablet; 1 tab daily

## 2024-12-23 ENCOUNTER — TELEPHONE (OUTPATIENT)
Age: 54
End: 2024-12-23

## 2024-12-23 ENCOUNTER — OFFICE VISIT (OUTPATIENT)
Dept: ENDOCRINOLOGY | Facility: CLINIC | Age: 54
End: 2024-12-23
Payer: COMMERCIAL

## 2024-12-23 VITALS
DIASTOLIC BLOOD PRESSURE: 68 MMHG | WEIGHT: 142 LBS | SYSTOLIC BLOOD PRESSURE: 106 MMHG | HEIGHT: 63 IN | BODY MASS INDEX: 25.16 KG/M2

## 2024-12-23 DIAGNOSIS — E03.9 ACQUIRED HYPOTHYROIDISM: ICD-10-CM

## 2024-12-23 DIAGNOSIS — E04.1 THYROID NODULE: ICD-10-CM

## 2024-12-23 DIAGNOSIS — E03.9 ACQUIRED HYPOTHYROIDISM: Primary | ICD-10-CM

## 2024-12-23 PROCEDURE — 99214 OFFICE O/P EST MOD 30 MIN: CPT | Performed by: PHYSICIAN ASSISTANT

## 2024-12-23 RX ORDER — LEVOTHYROXINE SODIUM 125 MCG
TABLET ORAL
Qty: 90 TABLET | Refills: 3 | Status: SHIPPED | OUTPATIENT
Start: 2024-12-23 | End: 2024-12-23 | Stop reason: SDUPTHER

## 2024-12-23 RX ORDER — LEVOTHYROXINE SODIUM 125 MCG
TABLET ORAL
Qty: 90 TABLET | Refills: 3 | Status: SHIPPED | OUTPATIENT
Start: 2024-12-23

## 2024-12-23 NOTE — TELEPHONE ENCOUNTER
Patient called back  her Synthroid needs to go  to  [facility]  not [facility].

## 2025-03-27 ENCOUNTER — HOSPITAL ENCOUNTER (OUTPATIENT)
Dept: ULTRASOUND IMAGING | Facility: CLINIC | Age: 55
Discharge: HOME | End: 2025-03-27
Payer: COMMERCIAL

## 2025-03-27 DIAGNOSIS — E04.1 THYROID NODULE: ICD-10-CM

## 2025-03-27 PROCEDURE — 76536 US EXAM OF HEAD AND NECK: CPT

## 2025-04-25 ENCOUNTER — TELEPHONE (OUTPATIENT)
Age: 55
End: 2025-04-25

## 2025-04-25 NOTE — TELEPHONE ENCOUNTER
Patients CRS provider:  Dr. Herrera    Number to return call: [phone]    Reason for call: Pt called stating she received a message from [employer] in regard to having 3 year cologuard test. Pt is asking if she needs to do testing. Pt asked to please message her through [facility] with Dr Herrera's response.    Scheduled procedure/appointment date if applicable: N/A

## 2025-04-26 DIAGNOSIS — Z12.11 SCREENING FOR MALIGNANT NEOPLASM OF COLON: Primary | ICD-10-CM
